# Patient Record
Sex: FEMALE | Race: ASIAN | NOT HISPANIC OR LATINO | Employment: OTHER | URBAN - METROPOLITAN AREA
[De-identification: names, ages, dates, MRNs, and addresses within clinical notes are randomized per-mention and may not be internally consistent; named-entity substitution may affect disease eponyms.]

---

## 2019-01-16 ENCOUNTER — LAB VISIT (OUTPATIENT)
Dept: LAB | Facility: HOSPITAL | Age: 24
End: 2019-01-16
Attending: OBSTETRICS & GYNECOLOGY
Payer: COMMERCIAL

## 2019-01-16 ENCOUNTER — INITIAL PRENATAL (OUTPATIENT)
Dept: OBSTETRICS AND GYNECOLOGY | Facility: CLINIC | Age: 24
End: 2019-01-16
Payer: COMMERCIAL

## 2019-01-16 VITALS — SYSTOLIC BLOOD PRESSURE: 112 MMHG | WEIGHT: 146.63 LBS | DIASTOLIC BLOOD PRESSURE: 58 MMHG

## 2019-01-16 DIAGNOSIS — Z34.90 EARLY STAGE OF PREGNANCY: ICD-10-CM

## 2019-01-16 DIAGNOSIS — N84.1 CERVICAL POLYP: ICD-10-CM

## 2019-01-16 DIAGNOSIS — Z20.4 RUBELLA VACCINE EXPOSURE AFFECTING PREGNANCY: Primary | ICD-10-CM

## 2019-01-16 DIAGNOSIS — Z3A.13 13 WEEKS GESTATION OF PREGNANCY: ICD-10-CM

## 2019-01-16 DIAGNOSIS — O99.891 RUBELLA VACCINE EXPOSURE AFFECTING PREGNANCY: Primary | ICD-10-CM

## 2019-01-16 LAB
ABO + RH BLD: NORMAL
BLD GP AB SCN CELLS X3 SERPL QL: NORMAL
ERYTHROCYTE [DISTWIDTH] IN BLOOD BY AUTOMATED COUNT: 12.9 %
HCT VFR BLD AUTO: 37.1 %
HGB BLD-MCNC: 12.8 G/DL
MCH RBC QN AUTO: 30.2 PG
MCHC RBC AUTO-ENTMCNC: 34.5 G/DL
MCV RBC AUTO: 88 FL
PLATELET # BLD AUTO: 246 K/UL
PMV BLD AUTO: 9.4 FL
RBC # BLD AUTO: 4.24 M/UL
WBC # BLD AUTO: 10.16 K/UL

## 2019-01-16 PROCEDURE — 83020 HEMOGLOBIN ELECTROPHORESIS: CPT

## 2019-01-16 PROCEDURE — 86901 BLOOD TYPING SEROLOGIC RH(D): CPT

## 2019-01-16 PROCEDURE — 76801 PR US, OB <14WKS, TRANSABD, SINGLE GESTATION: ICD-10-PCS | Mod: S$GLB,,, | Performed by: OBSTETRICS & GYNECOLOGY

## 2019-01-16 PROCEDURE — 81003 URINALYSIS AUTO W/O SCOPE: CPT

## 2019-01-16 PROCEDURE — 85027 COMPLETE CBC AUTOMATED: CPT

## 2019-01-16 PROCEDURE — 86703 HIV-1/HIV-2 1 RESULT ANTBDY: CPT

## 2019-01-16 PROCEDURE — 99999 PR PBB SHADOW E&M-NEW PATIENT-LVL II: ICD-10-PCS | Mod: PBBFAC,,, | Performed by: OBSTETRICS & GYNECOLOGY

## 2019-01-16 PROCEDURE — 88175 CYTOPATH C/V AUTO FLUID REDO: CPT

## 2019-01-16 PROCEDURE — 87480 CANDIDA DNA DIR PROBE: CPT

## 2019-01-16 PROCEDURE — 0500F PR INITIAL PRENATAL CARE VISIT: ICD-10-PCS | Mod: S$GLB,,, | Performed by: OBSTETRICS & GYNECOLOGY

## 2019-01-16 PROCEDURE — 36415 COLL VENOUS BLD VENIPUNCTURE: CPT

## 2019-01-16 PROCEDURE — 80074 ACUTE HEPATITIS PANEL: CPT

## 2019-01-16 PROCEDURE — 87491 CHLMYD TRACH DNA AMP PROBE: CPT

## 2019-01-16 PROCEDURE — 81220 CFTR GENE COM VARIANTS: CPT

## 2019-01-16 PROCEDURE — 86762 RUBELLA ANTIBODY: CPT

## 2019-01-16 PROCEDURE — 86592 SYPHILIS TEST NON-TREP QUAL: CPT

## 2019-01-16 PROCEDURE — 99999 PR PBB SHADOW E&M-NEW PATIENT-LVL II: CPT | Mod: PBBFAC,,, | Performed by: OBSTETRICS & GYNECOLOGY

## 2019-01-16 PROCEDURE — 76801 OB US < 14 WKS SINGLE FETUS: CPT | Mod: S$GLB,,, | Performed by: OBSTETRICS & GYNECOLOGY

## 2019-01-16 PROCEDURE — 0500F INITIAL PRENATAL CARE VISIT: CPT | Mod: S$GLB,,, | Performed by: OBSTETRICS & GYNECOLOGY

## 2019-01-17 PROBLEM — N84.1 CERVICAL POLYP: Status: ACTIVE | Noted: 2019-01-17

## 2019-01-17 PROBLEM — O99.891: Status: ACTIVE | Noted: 2019-01-17

## 2019-01-17 PROBLEM — Z20.4: Status: ACTIVE | Noted: 2019-01-17

## 2019-01-17 LAB
BILIRUB UR QL STRIP: NEGATIVE
CLARITY UR: CLEAR
COLOR UR: ABNORMAL
GLUCOSE UR QL STRIP: NEGATIVE
HAV IGM SERPL QL IA: NEGATIVE
HBV CORE IGM SERPL QL IA: NEGATIVE
HBV SURFACE AG SERPL QL IA: NEGATIVE
HBV SURFACE AG SERPL QL IA: NEGATIVE
HCV AB SERPL QL IA: NEGATIVE
HGB UR QL STRIP: NEGATIVE
HIV 1+2 AB+HIV1 P24 AG SERPL QL IA: NEGATIVE
KETONES UR QL STRIP: ABNORMAL
LEUKOCYTE ESTERASE UR QL STRIP: NEGATIVE
NITRITE UR QL STRIP: NEGATIVE
PH UR STRIP: 6 [PH] (ref 5–8)
PROT UR QL STRIP: NEGATIVE
SP GR UR STRIP: 1.01 (ref 1–1.03)
URN SPEC COLLECT METH UR: ABNORMAL
UROBILINOGEN UR STRIP-ACNC: NEGATIVE EU/DL

## 2019-01-17 NOTE — PROGRESS NOTES
Patient presented today for new Ob appt.    Planned pregnancy: no she is  and excited  Unsure of LMP, but reported had MMR vaccine in November    ROS: NEG except    VB?: no  Cramping?:  no  Nausea/vomitting?: no    PMH: none  PSH: none  OBH: G1  GynHx: cycles every 1 - 1.5 month. Denies std    BP (!) 112/58   Wt 66.5 kg (146 lb 9.7 oz)   LMP  (LMP Unknown)   Phys Exam:  Abdomen: soft,NT, ND  Vulva and vagina appear normal. Bimanual exam reveals normal uterus and adnexa.  External genitalia: normal general appearance  Cervix: 3 x 2 cm polyp noted at cervical os    Transvaginal US:  Single IUP  YS not seen  GS In utero  CRL 6.92 cm, 13w1d   bpm  Adnexa normal  Today's US with STAR 7/23/19  LMP unknown ,  Final STAR:  7/23/19    1.  Early IUP:       -prenatal labs today       -GC/CT, affirm and papsmear done today       -Bleeding precautions       -Aneuploidy screen offered. Patient desires 2nd trimester aneuploidy screening       -Diet, exercise, and social habits d/w patients. Patient was counseled today on  proper weight gain based on the [facility] [employer] [facility]'s recommendations based  on her pre-pregnancy BMI normal, total pregnancy weight gain of 25-35 lbs.       -Discussed foods to avoid in pregnancy (i.e. sushi, fish that are high in mercury,  deli meat, and unpasteurized cheeses). Discussed  prenatal vitamin options and  safe medications in pregnancy.     2. Discussed how call group function, we do most of our deliveries however other providers will be covering and possibility will be there at the time of deliveries.  Pt voiced understanding.  3. Exposure to MMR early pregnancy:  Discussed with pt the theoretical risk of congential rubella.   4. Cervical polyp:  Discussed w/ pt. Consider removal at term gestation. Bleeding precautions  RTC in 4 weeks for Massimo visit

## 2019-01-18 LAB
CANDIDA RRNA VAG QL PROBE: NEGATIVE
CFTR MUT ANL BLD/T: NORMAL
G VAGINALIS RRNA GENITAL QL PROBE: POSITIVE
RPR SER QL: NORMAL
RUBV IGG SER-ACNC: 121 IU/ML
RUBV IGG SER-IMP: REACTIVE
T VAGINALIS RRNA GENITAL QL PROBE: NEGATIVE

## 2019-01-19 LAB
C TRACH DNA SPEC QL NAA+PROBE: NOT DETECTED
HGB A MFR BLD ELPH: 96.9 % (ref 95.8–98)
HGB A2 MFR BLD: 3.1 % (ref 2–3.3)
HGB F MFR BLD: 0 % (ref 0–0.9)
HGB FRACT BLD ELPH-IMP: NORMAL
HGB OTHER MFR BLD ELPH: 0 %
N GONORRHOEA DNA SPEC QL NAA+PROBE: NOT DETECTED
THEVP VARIANT 2: NORMAL
THEVP VARIANT 3: NORMAL

## 2019-01-22 RX ORDER — METRONIDAZOLE 500 MG/1
500 TABLET ORAL EVERY 12 HOURS
Qty: 14 TABLET | Refills: 0 | Status: SHIPPED | OUTPATIENT
Start: 2019-01-22 | End: 2019-01-29

## 2019-01-30 ENCOUNTER — TELEPHONE (OUTPATIENT)
Dept: OBSTETRICS AND GYNECOLOGY | Facility: CLINIC | Age: 24
End: 2019-01-30

## 2019-01-30 NOTE — TELEPHONE ENCOUNTER
----- Message from Jessica Suh sent at 1/30/2019  2:53 PM CST -----  Contact: Self/ [phone]  Patient would like Dr. Hammond to give her a call.  She's trying to get her citizenship and need a letter from  that she is pregnant and how long she is in her pregnancy.  Thank you.    Patient is aware that her pregnancy confirmation letter is ready for

## 2019-02-13 ENCOUNTER — LAB VISIT (OUTPATIENT)
Dept: LAB | Facility: HOSPITAL | Age: 24
End: 2019-02-13
Attending: OBSTETRICS & GYNECOLOGY
Payer: COMMERCIAL

## 2019-02-13 ENCOUNTER — ROUTINE PRENATAL (OUTPATIENT)
Dept: OBSTETRICS AND GYNECOLOGY | Facility: CLINIC | Age: 24
End: 2019-02-13
Payer: COMMERCIAL

## 2019-02-13 VITALS — WEIGHT: 154.13 LBS | DIASTOLIC BLOOD PRESSURE: 53 MMHG | SYSTOLIC BLOOD PRESSURE: 116 MMHG

## 2019-02-13 DIAGNOSIS — Z36.9 ENCOUNTER FOR ANTENATAL SCREENING: ICD-10-CM

## 2019-02-13 DIAGNOSIS — O99.891 RUBELLA VACCINE EXPOSURE AFFECTING PREGNANCY: Primary | ICD-10-CM

## 2019-02-13 DIAGNOSIS — Z20.4 RUBELLA VACCINE EXPOSURE AFFECTING PREGNANCY: Primary | ICD-10-CM

## 2019-02-13 DIAGNOSIS — Z3A.17 17 WEEKS GESTATION OF PREGNANCY: ICD-10-CM

## 2019-02-13 PROCEDURE — 0502F SUBSEQUENT PRENATAL CARE: CPT | Mod: CPTII,S$GLB,, | Performed by: OBSTETRICS & GYNECOLOGY

## 2019-02-13 PROCEDURE — 81511 FTL CGEN ABNOR FOUR ANAL: CPT

## 2019-02-13 PROCEDURE — 0502F PR SUBSEQUENT PRENATAL CARE: ICD-10-PCS | Mod: CPTII,S$GLB,, | Performed by: OBSTETRICS & GYNECOLOGY

## 2019-02-13 PROCEDURE — 36415 COLL VENOUS BLD VENIPUNCTURE: CPT

## 2019-02-13 PROCEDURE — 99999 PR PBB SHADOW E&M-EST. PATIENT-LVL II: CPT | Mod: PBBFAC,,, | Performed by: OBSTETRICS & GYNECOLOGY

## 2019-02-13 PROCEDURE — 99999 PR PBB SHADOW E&M-EST. PATIENT-LVL II: ICD-10-PCS | Mod: PBBFAC,,, | Performed by: OBSTETRICS & GYNECOLOGY

## 2019-02-13 RX ORDER — MULTIVIT 47/IRON/FOLATE 1/DHA 27-1-300MG
CAPSULE ORAL
COMMUNITY
Start: 2019-01-16

## 2019-02-13 NOTE — PROGRESS NOTES
Here for routine OB visit  Denies VB/ctx/LOF. Active FM  Denies PreE symptoms/UTI symptoms  ROS: Negative except for round ligament pain    General:  NAD, AxO x 3  Abdomen: soft, gravid, non-tender  Ext: peripheral pulses normal, no pedal edema, no clubbing or cyanosis  Skin: warm, moist    Discussed supportive care for round ligament pain  QS and anatomy US ordered  Limited US; boy    Strict bleeding precautions    Next visit: 4 weeks

## 2019-02-15 LAB
# FETUSES US: NORMAL
2ND TRIMESTER 4 SCREEN PNL SERPL: NEGATIVE
2ND TRIMESTER 4 SCREEN SERPL-IMP: NORMAL
AFP MOM SERPL: 0.7
AFP SERPL-MCNC: 25.7 NG/ML
AGE AT DELIVERY: 23
B-HCG MOM SERPL: 0.81
B-HCG SERPL-ACNC: 20.7 IU/ML
FET TS 21 RISK FROM MAT AGE: NORMAL
GA (DAYS): 1 D
GA (WEEKS): 17 WK
GA METHOD: NORMAL
IDDM PATIENT QL: NORMAL
INHIBIN A MOM SERPL: 1.09
INHIBIN A SERPL-MCNC: 177.1 PG/ML
SMOKING STATUS FTND: NO
TS 18 RISK FETUS: NORMAL
TS 21 RISK FETUS: NORMAL
U ESTRIOL MOM SERPL: 1.27
U ESTRIOL SERPL-MCNC: 1.49 NG/ML

## 2019-02-15 NOTE — PROGRESS NOTES
Please inform pt quad screen was negative. This means that her baby is at low risk for Down's syndrome or spina bifida.

## 2019-03-13 ENCOUNTER — ROUTINE PRENATAL (OUTPATIENT)
Dept: OBSTETRICS AND GYNECOLOGY | Facility: CLINIC | Age: 24
End: 2019-03-13
Payer: COMMERCIAL

## 2019-03-13 VITALS — WEIGHT: 156.94 LBS | DIASTOLIC BLOOD PRESSURE: 57 MMHG | SYSTOLIC BLOOD PRESSURE: 103 MMHG

## 2019-03-13 DIAGNOSIS — O99.891 RUBELLA VACCINE EXPOSURE AFFECTING PREGNANCY: Primary | ICD-10-CM

## 2019-03-13 DIAGNOSIS — Z3A.21 21 WEEKS GESTATION OF PREGNANCY: ICD-10-CM

## 2019-03-13 DIAGNOSIS — Z20.4 RUBELLA VACCINE EXPOSURE AFFECTING PREGNANCY: Primary | ICD-10-CM

## 2019-03-13 PROCEDURE — 99999 PR PBB SHADOW E&M-EST. PATIENT-LVL II: ICD-10-PCS | Mod: PBBFAC,,, | Performed by: OBSTETRICS & GYNECOLOGY

## 2019-03-13 PROCEDURE — 0502F PR SUBSEQUENT PRENATAL CARE: ICD-10-PCS | Mod: CPTII,S$GLB,, | Performed by: OBSTETRICS & GYNECOLOGY

## 2019-03-13 PROCEDURE — 99999 PR PBB SHADOW E&M-EST. PATIENT-LVL II: CPT | Mod: PBBFAC,,, | Performed by: OBSTETRICS & GYNECOLOGY

## 2019-03-13 PROCEDURE — 0502F SUBSEQUENT PRENATAL CARE: CPT | Mod: CPTII,S$GLB,, | Performed by: OBSTETRICS & GYNECOLOGY

## 2019-03-13 NOTE — PROGRESS NOTES
Here for routine OB visit  Denies VB/ctx/LOF. Active FM  Denies PreE symptoms/UTI symptoms  ROS: Negative    General:  NAD, AxO x 3  Abdomen: soft, gravid, non-tender  Ext: peripheral pulses normal, no pedal edema, no clubbing or cyanosis  Skin: normal, good color, good turgor and no lesions    QS negative  Anatomy scheduled for 3/19/19    Strict bleeding precautions    Next visit: 4 weeks

## 2019-03-19 ENCOUNTER — HOSPITAL ENCOUNTER (OUTPATIENT)
Dept: PERINATAL CARE | Facility: HOSPITAL | Age: 24
Discharge: HOME | End: 2019-03-19
Attending: OBSTETRICS & GYNECOLOGY
Payer: COMMERCIAL

## 2019-03-19 DIAGNOSIS — Z3A.17 17 WEEKS GESTATION OF PREGNANCY: ICD-10-CM

## 2019-03-19 DIAGNOSIS — Z20.4 RUBELLA VACCINE EXPOSURE AFFECTING PREGNANCY: ICD-10-CM

## 2019-03-19 DIAGNOSIS — O34.42 HISTORY OF LEEP (LOOP ELECTROSURGICAL EXCISION PROCEDURE) OF CERVIX COMPLICATING PREGNANCY IN SECOND TRIMESTER: ICD-10-CM

## 2019-03-19 DIAGNOSIS — Z36.89 ENCOUNTER FOR FETAL ANATOMIC SURVEY: ICD-10-CM

## 2019-03-19 DIAGNOSIS — Z36.9 ENCOUNTER FOR ANTENATAL SCREENING: ICD-10-CM

## 2019-03-19 DIAGNOSIS — Z98.890 HISTORY OF LEEP (LOOP ELECTROSURGICAL EXCISION PROCEDURE) OF CERVIX COMPLICATING PREGNANCY IN SECOND TRIMESTER: ICD-10-CM

## 2019-03-19 DIAGNOSIS — O99.891 RUBELLA VACCINE EXPOSURE AFFECTING PREGNANCY: ICD-10-CM

## 2019-03-19 PROCEDURE — 76811 OB US DETAILED SNGL FETUS: CPT | Mod: 26,,, | Performed by: OBSTETRICS & GYNECOLOGY

## 2019-03-19 PROCEDURE — 76817 PR US, OB, TRANSVAG APPROACH: ICD-10-PCS | Mod: 26,,, | Performed by: OBSTETRICS & GYNECOLOGY

## 2019-03-19 PROCEDURE — 76811 OB US DETAILED SNGL FETUS: CPT

## 2019-03-19 PROCEDURE — 76817 TRANSVAGINAL US OBSTETRIC: CPT

## 2019-03-19 PROCEDURE — 76817 TRANSVAGINAL US OBSTETRIC: CPT | Mod: 26,,, | Performed by: OBSTETRICS & GYNECOLOGY

## 2019-03-19 PROCEDURE — 76811 PR US, OB FETAL EVAL & EXAM, TRANSABDOM,FIRST GESTATION: ICD-10-PCS | Mod: 26,,, | Performed by: OBSTETRICS & GYNECOLOGY

## 2019-03-20 ENCOUNTER — TELEPHONE (OUTPATIENT)
Dept: OBSTETRICS AND GYNECOLOGY | Facility: CLINIC | Age: 24
End: 2019-03-20

## 2019-03-20 NOTE — TELEPHONE ENCOUNTER
----- Message from [facility] Lawson Mai, MD sent at 3/20/2019 12:31 PM CDT -----  Please inform normal anatomy US

## 2019-04-10 ENCOUNTER — ROUTINE PRENATAL (OUTPATIENT)
Dept: OBSTETRICS AND GYNECOLOGY | Facility: CLINIC | Age: 24
End: 2019-04-10
Payer: COMMERCIAL

## 2019-04-10 VITALS — SYSTOLIC BLOOD PRESSURE: 120 MMHG | DIASTOLIC BLOOD PRESSURE: 59 MMHG | WEIGHT: 160 LBS

## 2019-04-10 DIAGNOSIS — O99.891 RUBELLA VACCINE EXPOSURE AFFECTING PREGNANCY: Primary | ICD-10-CM

## 2019-04-10 DIAGNOSIS — Z20.4 RUBELLA VACCINE EXPOSURE AFFECTING PREGNANCY: Primary | ICD-10-CM

## 2019-04-10 DIAGNOSIS — Z3A.25 25 WEEKS GESTATION OF PREGNANCY: ICD-10-CM

## 2019-04-10 PROCEDURE — 0502F SUBSEQUENT PRENATAL CARE: CPT | Mod: CPTII,S$GLB,, | Performed by: OBSTETRICS & GYNECOLOGY

## 2019-04-10 PROCEDURE — 99999 PR PBB SHADOW E&M-EST. PATIENT-LVL II: CPT | Mod: PBBFAC,,, | Performed by: OBSTETRICS & GYNECOLOGY

## 2019-04-10 PROCEDURE — 0502F PR SUBSEQUENT PRENATAL CARE: ICD-10-PCS | Mod: CPTII,S$GLB,, | Performed by: OBSTETRICS & GYNECOLOGY

## 2019-04-10 PROCEDURE — 99999 PR PBB SHADOW E&M-EST. PATIENT-LVL II: ICD-10-PCS | Mod: PBBFAC,,, | Performed by: OBSTETRICS & GYNECOLOGY

## 2019-04-10 NOTE — PROGRESS NOTES
Here for routine OB visit  Denies VB/ctx/LOF. Active FM. Pt c/o rash all over her arms and legs, has been scratching, has not tried anything.  Had been sneezing.  Denies PreE symptoms/UTI symptoms  ROS: Negative except HPI    General:  NAD, AxO x 3  Abdomen: soft, gravid, non-tender  Ext: peripheral pulses normal, no pedal edema, no clubbing or cyanosis  Skin: multiple small rashes on b/l arms and legs    Anatomy US normal   3rd trim lab  Likely seasonal allergy: recommend prednisone cream, zyrtec/benadryl prn    Strict Labor precautions, FKCs    Next visit: tdap

## 2019-04-15 ENCOUNTER — LAB VISIT (OUTPATIENT)
Dept: LAB | Facility: HOSPITAL | Age: 24
End: 2019-04-15
Attending: OBSTETRICS & GYNECOLOGY
Payer: MEDICAID

## 2019-04-15 DIAGNOSIS — O99.810 ABNORMAL GLUCOSE TOLERANCE IN PREGNANCY: Primary | ICD-10-CM

## 2019-04-15 DIAGNOSIS — Z3A.25 25 WEEKS GESTATION OF PREGNANCY: ICD-10-CM

## 2019-04-15 LAB
BASOPHILS # BLD AUTO: 0.03 K/UL (ref 0–0.2)
BASOPHILS NFR BLD: 0.3 % (ref 0–1.9)
DIFFERENTIAL METHOD: ABNORMAL
EOSINOPHIL # BLD AUTO: 0.3 K/UL (ref 0–0.5)
EOSINOPHIL NFR BLD: 2.5 % (ref 0–8)
ERYTHROCYTE [DISTWIDTH] IN BLOOD BY AUTOMATED COUNT: 13 % (ref 11.5–14.5)
GLUCOSE SERPL-MCNC: 153 MG/DL (ref 70–140)
HCT VFR BLD AUTO: 35.7 % (ref 37–48.5)
HGB BLD-MCNC: 11.9 G/DL (ref 12–16)
LYMPHOCYTES # BLD AUTO: 1.8 K/UL (ref 1–4.8)
LYMPHOCYTES NFR BLD: 17.5 % (ref 18–48)
MCH RBC QN AUTO: 30.4 PG (ref 27–31)
MCHC RBC AUTO-ENTMCNC: 33.3 G/DL (ref 32–36)
MCV RBC AUTO: 91 FL (ref 82–98)
MONOCYTES # BLD AUTO: 0.6 K/UL (ref 0.3–1)
MONOCYTES NFR BLD: 5.8 % (ref 4–15)
NEUTROPHILS # BLD AUTO: 7.5 K/UL (ref 1.8–7.7)
NEUTROPHILS NFR BLD: 73.9 % (ref 38–73)
PLATELET # BLD AUTO: 254 K/UL (ref 150–350)
PMV BLD AUTO: 9.4 FL (ref 9.2–12.9)
RBC # BLD AUTO: 3.91 M/UL (ref 4–5.4)
WBC # BLD AUTO: 10.12 K/UL (ref 3.9–12.7)

## 2019-04-15 PROCEDURE — 86703 HIV-1/HIV-2 1 RESULT ANTBDY: CPT

## 2019-04-15 PROCEDURE — 85025 COMPLETE CBC W/AUTO DIFF WBC: CPT

## 2019-04-15 PROCEDURE — 82950 GLUCOSE TEST: CPT

## 2019-04-15 PROCEDURE — 36415 COLL VENOUS BLD VENIPUNCTURE: CPT

## 2019-04-15 PROCEDURE — 86592 SYPHILIS TEST NON-TREP QUAL: CPT

## 2019-04-15 NOTE — PROGRESS NOTES
Please inform pt her 1hr gtt is high  She will need 3 hr gtt, this will take 3-4 hours  She will need 8-10 hours of fasting prior to this lab

## 2019-04-16 LAB
HIV 1+2 AB+HIV1 P24 AG SERPL QL IA: NEGATIVE
RPR SER QL: NORMAL

## 2019-05-01 ENCOUNTER — LAB VISIT (OUTPATIENT)
Dept: LAB | Facility: HOSPITAL | Age: 24
End: 2019-05-01
Attending: OBSTETRICS & GYNECOLOGY
Payer: MEDICAID

## 2019-05-01 ENCOUNTER — TELEPHONE (OUTPATIENT)
Dept: OBSTETRICS AND GYNECOLOGY | Facility: CLINIC | Age: 24
End: 2019-05-01

## 2019-05-01 DIAGNOSIS — O99.810 ABNORMAL GLUCOSE TOLERANCE IN PREGNANCY: ICD-10-CM

## 2019-05-01 LAB
GLUCOSE SERPL-MCNC: 102 MG/DL
GLUCOSE SERPL-MCNC: 143 MG/DL
GLUCOSE SERPL-MCNC: 181 MG/DL
GLUCOSE SERPL-MCNC: 83 MG/DL (ref 70–110)

## 2019-05-01 PROCEDURE — 82951 GLUCOSE TOLERANCE TEST (GTT): CPT

## 2019-05-01 PROCEDURE — 36415 COLL VENOUS BLD VENIPUNCTURE: CPT

## 2019-05-01 PROCEDURE — 82952 GTT-ADDED SAMPLES: CPT

## 2019-05-01 NOTE — TELEPHONE ENCOUNTER
----- Message from [facility] Lawson Mai, MD sent at 5/1/2019  1:33 PM CDT -----  Please advise pt 3hr GTT is  Normal  I do advise pt to cut down on carbohydrates in her diet

## 2019-05-01 NOTE — TELEPHONE ENCOUNTER
Patient notified of normal glucose tolerance test. She was also instructed to cut back on carbohydrates.

## 2019-05-08 ENCOUNTER — ROUTINE PRENATAL (OUTPATIENT)
Dept: OBSTETRICS AND GYNECOLOGY | Facility: CLINIC | Age: 24
End: 2019-05-08
Payer: MEDICAID

## 2019-05-08 ENCOUNTER — CLINICAL SUPPORT (OUTPATIENT)
Dept: OBSTETRICS AND GYNECOLOGY | Facility: CLINIC | Age: 24
End: 2019-05-08
Payer: MEDICAID

## 2019-05-08 VITALS — WEIGHT: 169.06 LBS | SYSTOLIC BLOOD PRESSURE: 112 MMHG | DIASTOLIC BLOOD PRESSURE: 64 MMHG

## 2019-05-08 VITALS — DIASTOLIC BLOOD PRESSURE: 64 MMHG | SYSTOLIC BLOOD PRESSURE: 112 MMHG | WEIGHT: 169.06 LBS

## 2019-05-08 DIAGNOSIS — Z3A.29 29 WEEKS GESTATION OF PREGNANCY: ICD-10-CM

## 2019-05-08 DIAGNOSIS — Z20.4 RUBELLA VACCINE EXPOSURE AFFECTING PREGNANCY: Primary | ICD-10-CM

## 2019-05-08 DIAGNOSIS — Z23 NEED FOR TDAP VACCINATION: Primary | ICD-10-CM

## 2019-05-08 DIAGNOSIS — O99.891 RUBELLA VACCINE EXPOSURE AFFECTING PREGNANCY: Primary | ICD-10-CM

## 2019-05-08 PROCEDURE — 99999 PR PBB SHADOW E&M-EST. PATIENT-LVL II: CPT | Mod: PBBFAC,,,

## 2019-05-08 PROCEDURE — 99212 OFFICE O/P EST SF 10 MIN: CPT | Mod: PBBFAC,27,25

## 2019-05-08 PROCEDURE — 99213 OFFICE O/P EST LOW 20 MIN: CPT | Mod: TH,S$PBB,, | Performed by: OBSTETRICS & GYNECOLOGY

## 2019-05-08 PROCEDURE — 90471 IMMUNIZATION ADMIN: CPT | Mod: PBBFAC,VFC

## 2019-05-08 PROCEDURE — 99999 PR PBB SHADOW E&M-EST. PATIENT-LVL II: ICD-10-PCS | Mod: PBBFAC,,, | Performed by: OBSTETRICS & GYNECOLOGY

## 2019-05-08 PROCEDURE — 99213 PR OFFICE/OUTPT VISIT, EST, LEVL III, 20-29 MIN: ICD-10-PCS | Mod: TH,S$PBB,, | Performed by: OBSTETRICS & GYNECOLOGY

## 2019-05-08 PROCEDURE — 99999 PR PBB SHADOW E&M-EST. PATIENT-LVL II: CPT | Mod: PBBFAC,,, | Performed by: OBSTETRICS & GYNECOLOGY

## 2019-05-08 PROCEDURE — 99212 OFFICE O/P EST SF 10 MIN: CPT | Mod: PBBFAC,TH | Performed by: OBSTETRICS & GYNECOLOGY

## 2019-05-08 PROCEDURE — 99999 PR PBB SHADOW E&M-EST. PATIENT-LVL II: ICD-10-PCS | Mod: PBBFAC,,,

## 2019-05-08 RX ORDER — PRENATAL WITH FERROUS FUM AND FOLIC ACID 3080; 920; 120; 400; 22; 1.84; 3; 20; 10; 1; 12; 200; 27; 25; 2 [IU]/1; [IU]/1; MG/1; [IU]/1; MG/1; MG/1; MG/1; MG/1; MG/1; MG/1; UG/1; MG/1; MG/1; MG/1; MG/1
TABLET ORAL
COMMUNITY
Start: 2019-03-13

## 2019-05-08 NOTE — PROGRESS NOTES
Here for routine OB visit  Denies VB/ctx/LOF. Active FM, irregular won's hick  Denies PreE symptoms/UTI symptoms  ROS: Negative except for HPI    General:  NAD, AxO x 3  Abdomen: soft, gravid, non-tender  Ext: peripheral pulses normal, no pedal edema, no clubbing or cyanosis  Skin: normal, good color, good turgor and no lesions    tdap today  3rd trim lab with normal 3hr gtt   Advised pt to cut down on carb    Strict Labor precautions, FKCs    Next visit: 2 weeks

## 2019-05-08 NOTE — PROGRESS NOTES
PT ARRIVED @  0915  , PT'S ORIGINAL APPT WAS @ 1000  , PT WAS ROOMED @  1001    Pt here for T- DAP injection, explained what the medication is for, TDAP handout given to pt,  reviewed documentation of medication with pt, injection given via L Deltoid w/o difficulty. Pt stated her understanding of all instructions. Instructed pt to wait in the waiting room for 10-15 mins in case of an immediate adverse reaction

## 2019-05-21 ENCOUNTER — ROUTINE PRENATAL (OUTPATIENT)
Dept: OBSTETRICS AND GYNECOLOGY | Facility: CLINIC | Age: 24
End: 2019-05-21
Payer: MEDICAID

## 2019-05-21 VITALS — DIASTOLIC BLOOD PRESSURE: 60 MMHG | WEIGHT: 171.75 LBS | SYSTOLIC BLOOD PRESSURE: 94 MMHG

## 2019-05-21 DIAGNOSIS — Z20.4 RUBELLA VACCINE EXPOSURE AFFECTING PREGNANCY: Primary | ICD-10-CM

## 2019-05-21 DIAGNOSIS — Z3A.31 31 WEEKS GESTATION OF PREGNANCY: ICD-10-CM

## 2019-05-21 DIAGNOSIS — O99.891 RUBELLA VACCINE EXPOSURE AFFECTING PREGNANCY: Primary | ICD-10-CM

## 2019-05-21 DIAGNOSIS — N84.1 CERVICAL POLYP: ICD-10-CM

## 2019-05-21 PROCEDURE — 99999 PR PBB SHADOW E&M-EST. PATIENT-LVL II: ICD-10-PCS | Mod: PBBFAC,,, | Performed by: OBSTETRICS & GYNECOLOGY

## 2019-05-21 PROCEDURE — 99213 OFFICE O/P EST LOW 20 MIN: CPT | Mod: TH,S$PBB,, | Performed by: OBSTETRICS & GYNECOLOGY

## 2019-05-21 PROCEDURE — 99999 PR PBB SHADOW E&M-EST. PATIENT-LVL II: CPT | Mod: PBBFAC,,, | Performed by: OBSTETRICS & GYNECOLOGY

## 2019-05-21 PROCEDURE — 99213 PR OFFICE/OUTPT VISIT, EST, LEVL III, 20-29 MIN: ICD-10-PCS | Mod: TH,S$PBB,, | Performed by: OBSTETRICS & GYNECOLOGY

## 2019-05-21 PROCEDURE — 99212 OFFICE O/P EST SF 10 MIN: CPT | Mod: PBBFAC,TH | Performed by: OBSTETRICS & GYNECOLOGY

## 2019-05-21 NOTE — PROGRESS NOTES
Here for routine OB visit  Denies VB/ctx/LOF. Active FM  Denies PreE symptoms/UTI symptoms  ROS: Negative    General:  NAD, AxO x 3  Abdomen: soft, gravid, non-tender  Ext: peripheral pulses normal, no pedal edema, no clubbing or cyanosis  Skin: normal, good color, good turgor and no lesions      Strict Labor precautions, FKCs    Next visit: 2 weeks

## 2019-06-05 ENCOUNTER — ROUTINE PRENATAL (OUTPATIENT)
Dept: OBSTETRICS AND GYNECOLOGY | Facility: CLINIC | Age: 24
End: 2019-06-05
Payer: MEDICAID

## 2019-06-05 VITALS — DIASTOLIC BLOOD PRESSURE: 68 MMHG | WEIGHT: 175.94 LBS | SYSTOLIC BLOOD PRESSURE: 112 MMHG

## 2019-06-05 DIAGNOSIS — O99.891 RUBELLA VACCINE EXPOSURE AFFECTING PREGNANCY: Primary | ICD-10-CM

## 2019-06-05 DIAGNOSIS — Z3A.33 33 WEEKS GESTATION OF PREGNANCY: ICD-10-CM

## 2019-06-05 DIAGNOSIS — Z20.4 RUBELLA VACCINE EXPOSURE AFFECTING PREGNANCY: Primary | ICD-10-CM

## 2019-06-05 PROCEDURE — 99212 OFFICE O/P EST SF 10 MIN: CPT | Mod: PBBFAC,TH | Performed by: OBSTETRICS & GYNECOLOGY

## 2019-06-05 PROCEDURE — 99212 OFFICE O/P EST SF 10 MIN: CPT | Mod: TH,S$PBB,, | Performed by: OBSTETRICS & GYNECOLOGY

## 2019-06-05 PROCEDURE — 99212 PR OFFICE/OUTPT VISIT, EST, LEVL II, 10-19 MIN: ICD-10-PCS | Mod: TH,S$PBB,, | Performed by: OBSTETRICS & GYNECOLOGY

## 2019-06-05 PROCEDURE — 99999 PR PBB SHADOW E&M-EST. PATIENT-LVL II: ICD-10-PCS | Mod: PBBFAC,,, | Performed by: OBSTETRICS & GYNECOLOGY

## 2019-06-05 PROCEDURE — 99999 PR PBB SHADOW E&M-EST. PATIENT-LVL II: CPT | Mod: PBBFAC,,, | Performed by: OBSTETRICS & GYNECOLOGY

## 2019-06-05 NOTE — PROGRESS NOTES
Here for routine OB visit  Denies VB/ctx/LOF. Active FM  Denies PreE symptoms/UTI symptoms  ROS: Negative except for stretch marks    General:  NAD, AxO x 3  Abdomen: soft, gravid, non-tender  Ext: peripheral pulses normal, trace pedal edema, no clubbing or cyanosis  Skin: normal, good color, good turgor and no lesions. Stretch marks on b/l lower abdomen    rec mederma, coconut oil for stretch marks      Strict Labor precautions, FKCs    Next visit: gbs/consent

## 2019-06-20 ENCOUNTER — ROUTINE PRENATAL (OUTPATIENT)
Dept: OBSTETRICS AND GYNECOLOGY | Facility: CLINIC | Age: 24
End: 2019-06-20
Payer: MEDICAID

## 2019-06-20 VITALS — WEIGHT: 179.88 LBS | SYSTOLIC BLOOD PRESSURE: 110 MMHG | DIASTOLIC BLOOD PRESSURE: 78 MMHG

## 2019-06-20 DIAGNOSIS — Z20.4 RUBELLA VACCINE EXPOSURE AFFECTING PREGNANCY: Primary | ICD-10-CM

## 2019-06-20 DIAGNOSIS — Z3A.35 35 WEEKS GESTATION OF PREGNANCY: ICD-10-CM

## 2019-06-20 DIAGNOSIS — O99.891 RUBELLA VACCINE EXPOSURE AFFECTING PREGNANCY: Primary | ICD-10-CM

## 2019-06-20 PROCEDURE — 99213 OFFICE O/P EST LOW 20 MIN: CPT | Mod: TH,S$PBB,, | Performed by: OBSTETRICS & GYNECOLOGY

## 2019-06-20 PROCEDURE — 99999 PR PBB SHADOW E&M-EST. PATIENT-LVL II: ICD-10-PCS | Mod: PBBFAC,,, | Performed by: OBSTETRICS & GYNECOLOGY

## 2019-06-20 PROCEDURE — 99212 OFFICE O/P EST SF 10 MIN: CPT | Mod: PBBFAC,TH | Performed by: OBSTETRICS & GYNECOLOGY

## 2019-06-20 PROCEDURE — 87081 CULTURE SCREEN ONLY: CPT

## 2019-06-20 PROCEDURE — 99213 PR OFFICE/OUTPT VISIT, EST, LEVL III, 20-29 MIN: ICD-10-PCS | Mod: TH,S$PBB,, | Performed by: OBSTETRICS & GYNECOLOGY

## 2019-06-20 PROCEDURE — 99999 PR PBB SHADOW E&M-EST. PATIENT-LVL II: CPT | Mod: PBBFAC,,, | Performed by: OBSTETRICS & GYNECOLOGY

## 2019-06-23 LAB — BACTERIA SPEC AEROBE CULT: NORMAL

## 2019-07-03 ENCOUNTER — ROUTINE PRENATAL (OUTPATIENT)
Dept: OBSTETRICS AND GYNECOLOGY | Facility: CLINIC | Age: 24
End: 2019-07-03
Payer: MEDICAID

## 2019-07-03 VITALS — WEIGHT: 181.69 LBS | DIASTOLIC BLOOD PRESSURE: 62 MMHG | SYSTOLIC BLOOD PRESSURE: 98 MMHG

## 2019-07-03 DIAGNOSIS — Z3A.37 37 WEEKS GESTATION OF PREGNANCY: Primary | ICD-10-CM

## 2019-07-03 PROCEDURE — 99999 PR PBB SHADOW E&M-EST. PATIENT-LVL II: CPT | Mod: PBBFAC,,, | Performed by: OBSTETRICS & GYNECOLOGY

## 2019-07-03 PROCEDURE — 99213 OFFICE O/P EST LOW 20 MIN: CPT | Mod: TH,S$PBB,, | Performed by: OBSTETRICS & GYNECOLOGY

## 2019-07-03 PROCEDURE — 99999 PR PBB SHADOW E&M-EST. PATIENT-LVL II: ICD-10-PCS | Mod: PBBFAC,,, | Performed by: OBSTETRICS & GYNECOLOGY

## 2019-07-03 PROCEDURE — 99212 OFFICE O/P EST SF 10 MIN: CPT | Mod: PBBFAC | Performed by: OBSTETRICS & GYNECOLOGY

## 2019-07-03 PROCEDURE — 99213 PR OFFICE/OUTPT VISIT, EST, LEVL III, 20-29 MIN: ICD-10-PCS | Mod: TH,S$PBB,, | Performed by: OBSTETRICS & GYNECOLOGY

## 2019-07-03 NOTE — PROGRESS NOTES
37w1d here for OB visit.  Pt of Dr. Hammond.  Pt has no major complaints today.  Reports active fetus.  Denies vaginal bleeding, leakage of fluid, or contractions.  Pt declined cervical exam.  Labor/preE precautions.  Kick counts.  Return 1 wks.  Voiced understanding.

## 2019-07-12 ENCOUNTER — ROUTINE PRENATAL (OUTPATIENT)
Dept: OBSTETRICS AND GYNECOLOGY | Facility: CLINIC | Age: 24
End: 2019-07-12
Payer: MEDICAID

## 2019-07-12 VITALS — WEIGHT: 186.19 LBS | SYSTOLIC BLOOD PRESSURE: 119 MMHG | DIASTOLIC BLOOD PRESSURE: 56 MMHG

## 2019-07-12 DIAGNOSIS — Z34.03 ENCOUNTER FOR SUPERVISION OF NORMAL FIRST PREGNANCY IN THIRD TRIMESTER: Primary | ICD-10-CM

## 2019-07-12 PROCEDURE — 99215 PR OFFICE/OUTPT VISIT, EST, LEVL V, 40-54 MIN: ICD-10-PCS | Mod: 25,S$PBB,TH, | Performed by: OBSTETRICS & GYNECOLOGY

## 2019-07-12 PROCEDURE — 99999 PR PBB SHADOW E&M-EST. PATIENT-LVL II: CPT | Mod: PBBFAC,,, | Performed by: OBSTETRICS & GYNECOLOGY

## 2019-07-12 PROCEDURE — 99999 PR PBB SHADOW E&M-EST. PATIENT-LVL II: ICD-10-PCS | Mod: PBBFAC,,, | Performed by: OBSTETRICS & GYNECOLOGY

## 2019-07-12 PROCEDURE — 99215 OFFICE O/P EST HI 40 MIN: CPT | Mod: 25,S$PBB,TH, | Performed by: OBSTETRICS & GYNECOLOGY

## 2019-07-12 PROCEDURE — 99212 OFFICE O/P EST SF 10 MIN: CPT | Mod: PBBFAC,TH | Performed by: OBSTETRICS & GYNECOLOGY

## 2019-07-12 NOTE — PROGRESS NOTES
Complaints today: Ms. Dupont reports that she is doing well with no complaints. Normal fetal movements with no vaginal bleeding, LOF or contractions at this time.       BP (!) 119/56   Wt 84.5 kg (186 lb 2.9 oz)   LMP  (LMP Unknown)     24 y.o., at 38w3d by Estimated Date of Delivery: 19  Patient Active Problem List   Diagnosis    Rubella vaccine exposure affecting pregnancy    Cervical polyp    Encounter for fetal anatomic survey     OB History    Para Term  AB Living   1             SAB TAB Ectopic Multiple Live Births                  # Outcome Date GA Lbr Estiven/2nd Weight Sex Delivery Anes PTL Lv   1 Current               Obstetric Comments   Every 30-45 days   Denies std   Never had pap       Dating reviewed    Allergies and problem list reviewed and updated    Medical and surgical history reviewed    Prenatal labs reviewed and updated    Physical Exam:  ABD: soft, gravid, nontender, 38cm    Assessment:  Diana was seen today for routine prenatal visit.    Diagnoses and all orders for this visit:    Encounter for supervision of normal first pregnancy in third trimester  - Discussed normal labor and bleeding precautions with patient  - Discussed preeclampsia precautions with patient   - Patient voiced understanding and all questions answered    No orders of the defined types were placed in this encounter.      Follow up in about 1 week (around 2019) for with Dr. Hammond.

## 2019-07-12 NOTE — PATIENT INSTRUCTIONS
Recognizing Labor    The beginning of labor is the beginning of birth. Youll start to feel strong contractions. Thats when the muscles of your uterus tighten up to help push your baby out during birth.  Yes, labor has probably started   Signs of labor include:  · Your contractions are getting stronger and more painful instead of weaker. Youll probably feel them throughout your whole uterus.  · Your contractions are regular. This means that you feel them about every 5 to 10 minutes. And they are getting closer together.  · You have pink-colored or blood-streaked fluid from your vagina.  · Your water breaks. It may be a gush or a slow trickle of clear fluid from your vagina.  No, its probably not real labor   Signs of false labor include:  · Your contractions arent regular or strong.  · You feel the contractions only in your lower uterus.  · Your contractions go away when you walk or change position.  · Your contractions go away after drinking fluids.  When to call your healthcare provider  Call your healthcare provider or clinic right away if you notice any of these signs:  · Fluid from your vagina, with or without contractions.  · Bleeding heavy enough that you need a sanitary pad.  · You dont feel your baby moving as much as before.     NOTE: Contractions are timed by both of these measures:  · The length of each contraction from its start to its finish.  · How far apart the contractions are -- the time between the start of one contraction and the start of the next contraction.   Date Last Reviewed: 8/9/2015  © 8067-0354 [employer]. [address] [place] [address], [place] [address]. All rights reserved. This information is not intended as a substitute for professional medical care. Always follow your healthcare professional's instructions.

## 2019-07-17 ENCOUNTER — ROUTINE PRENATAL (OUTPATIENT)
Dept: OBSTETRICS AND GYNECOLOGY | Facility: CLINIC | Age: 24
End: 2019-07-17
Payer: MEDICAID

## 2019-07-17 VITALS — SYSTOLIC BLOOD PRESSURE: 124 MMHG | WEIGHT: 182.75 LBS | DIASTOLIC BLOOD PRESSURE: 72 MMHG

## 2019-07-17 DIAGNOSIS — O26.843 UTERINE SIZE-DATE DISCREPANCY IN THIRD TRIMESTER: Primary | ICD-10-CM

## 2019-07-17 DIAGNOSIS — O99.891 RUBELLA VACCINE EXPOSURE AFFECTING PREGNANCY: ICD-10-CM

## 2019-07-17 DIAGNOSIS — Z20.4 RUBELLA VACCINE EXPOSURE AFFECTING PREGNANCY: ICD-10-CM

## 2019-07-17 DIAGNOSIS — Z3A.39 39 WEEKS GESTATION OF PREGNANCY: ICD-10-CM

## 2019-07-17 PROCEDURE — 99212 OFFICE O/P EST SF 10 MIN: CPT | Mod: PBBFAC,TH | Performed by: OBSTETRICS & GYNECOLOGY

## 2019-07-17 PROCEDURE — 99999 PR PBB SHADOW E&M-EST. PATIENT-LVL II: CPT | Mod: PBBFAC,,, | Performed by: OBSTETRICS & GYNECOLOGY

## 2019-07-17 PROCEDURE — 99213 PR OFFICE/OUTPT VISIT, EST, LEVL III, 20-29 MIN: ICD-10-PCS | Mod: TH,S$PBB,, | Performed by: OBSTETRICS & GYNECOLOGY

## 2019-07-17 PROCEDURE — 99999 PR PBB SHADOW E&M-EST. PATIENT-LVL II: ICD-10-PCS | Mod: PBBFAC,,, | Performed by: OBSTETRICS & GYNECOLOGY

## 2019-07-17 PROCEDURE — 99213 OFFICE O/P EST LOW 20 MIN: CPT | Mod: TH,S$PBB,, | Performed by: OBSTETRICS & GYNECOLOGY

## 2019-07-18 RX ORDER — SODIUM CHLORIDE 9 MG/ML
INJECTION, SOLUTION INTRAVENOUS
Status: CANCELLED | OUTPATIENT
Start: 2019-07-18

## 2019-07-18 RX ORDER — MISOPROSTOL 100 UG/1
600 TABLET ORAL
Status: CANCELLED | OUTPATIENT
Start: 2019-07-18

## 2019-07-18 RX ORDER — SODIUM CHLORIDE, SODIUM LACTATE, POTASSIUM CHLORIDE, CALCIUM CHLORIDE 600; 310; 30; 20 MG/100ML; MG/100ML; MG/100ML; MG/100ML
INJECTION, SOLUTION INTRAVENOUS CONTINUOUS
Status: CANCELLED | OUTPATIENT
Start: 2019-07-18

## 2019-07-18 RX ORDER — MORPHINE SULFATE 10 MG/ML
4 INJECTION, SOLUTION INTRAMUSCULAR; INTRAVENOUS EVERY 4 HOURS PRN
Status: CANCELLED | OUTPATIENT
Start: 2019-07-18

## 2019-07-18 RX ORDER — ONDANSETRON 4 MG/1
8 TABLET, ORALLY DISINTEGRATING ORAL EVERY 8 HOURS PRN
Status: CANCELLED | OUTPATIENT
Start: 2019-07-18

## 2019-07-18 NOTE — PROGRESS NOTES
Here for routine OB visit  Denies VB/ctx/LOF. Active FM  Denies PreE symptoms/UTI symptoms  ROS: Negative except feeling uncomfortable    General:  NAD, AxO x 3  Abdomen: soft, gravid, non-tender  Ext: peripheral pulses normal, no pedal edema, no clubbing or cyanosis  Skin: normal, good color, good turgor and no lesions  Sve: close/th/high, cephalic by US    Size > date:    Abd US done, sviup, anterior placenta, cephalic, EFW 77% (3751g), 8lb  4oz.   BPD 9.54 cm  HC 34.04 cm  AC 36.11  FL 7.76    BRANDON visually adequate for gestation age    IOL scheduled for 7/21/19 @ 12pm - cervadil    Strict Labor precautions, FKCs

## 2019-07-19 NOTE — H&P
Admission History and Physical to Labor and Delivery  Primary Ob-Gyn: Dr. Hammond    Subjective:   Diana Dupont is a 24 y.o.  female @ 39w5d by LMP consistent with early ultrasound, who is admitted for elective induction of labor.    Patient reports no complaints. Fetal Movement: normal.     Her current obstetrical history is significant for exposure to MMR vaccine during first trimester.    Pertinent prenatal labs:  Rh Negative: no  GBS Positive: no  Rubella Non-Immune: no    No past medical history on file.  No past surgical history on file.  OB History    Para Term  AB Living   1 0 0 0 0 0   SAB TAB Ectopic Multiple Live Births   0 0 0 0 0      # Outcome Date GA Lbr Estiven/2nd Weight Sex Delivery Anes PTL Lv   1 Current               Obstetric Comments   Every 30-45 days   Denies std   Never had pap     Prior OB complications: none  No family history on file.  Social History     Socioeconomic History    Marital status:      Spouse name: Not on file    Number of children: Not on file    Years of education: Not on file    Highest education level: Not on file   Occupational History    Not on file   Social Needs    Financial resource strain: Not on file    Food insecurity:     Worry: Not on file     Inability: Not on file    Transportation needs:     Medical: Not on file     Non-medical: Not on file   Tobacco Use    Smoking status: Never Smoker    Smokeless tobacco: Never Used   Substance and Sexual Activity    Alcohol use: No     Frequency: Never    Drug use: No    Sexual activity: Yes     Partners: Male     Birth control/protection: None   Lifestyle    Physical activity:     Days per week: Not on file     Minutes per session: Not on file    Stress: Not on file   Relationships    Social connections:     Talks on phone: Not on file     Gets together: Not on file     Attends Anabaptist service: Not on file     Active member of club or organization: Not on file     Attends meetings of  clubs or organizations: Not on file     Relationship status: Not on file   Other Topics Concern    Not on file   Social History Narrative    Not on file       Objective:     Vital signs in last 24 hours:  [unfilled]    General: alert, appears stated age and cooperative  Skin: normal  HEENT: trachea midline  Lungs: non-labored  Heart: and regular rate  Abdomen: soft, non-distended, gravid with non-tender fundus  Extremity: trace edema, no evidence of DVT  FHT: 135 BPM   Uterine Size: size greater than dates  leopold ~ 8.5lb  Presentations: cephalic  Cervix:  Dilation: 0 CM  Effacement: thick  Station: -3  Consistency: medium  Position:  middle     Dilation: Closed  Presentation: Vertex    Lab Review     No visits with results within 1 Day(s) from this visit.   Latest known visit with results is:   Routine Prenatal on 06/20/2019   Component Date Value Ref Range Status    Strep B Culture 06/20/2019 No Group B Streptococcus isolated   Final       Assessment/Plan:  1.  Term pregnancy: plan for cervidil cervical ripening follow with pitocin    Anticipate vaginal delivery.      Risks, benefits, alternatives and possible complications have been discussed in detail with the patient. Pre-admission, admission, and post admission procedures and expectations were discussed in detail. All questions answered, all appropriate consents will be signed.    Teressa Hammond

## 2019-07-21 ENCOUNTER — HOSPITAL ENCOUNTER (INPATIENT)
Facility: HOSPITAL | Age: 24
LOS: 6 days | Discharge: HOME | End: 2019-07-27
Attending: OBSTETRICS & GYNECOLOGY | Admitting: OBSTETRICS & GYNECOLOGY
Payer: MEDICAID

## 2019-07-21 DIAGNOSIS — O26.843 UTERINE SIZE-DATE DISCREPANCY IN THIRD TRIMESTER: Primary | ICD-10-CM

## 2019-07-21 DIAGNOSIS — Z98.891 S/P CESAREAN SECTION: ICD-10-CM

## 2019-07-21 LAB
ABO + RH BLD: NORMAL
BASOPHILS # BLD AUTO: 0.02 K/UL (ref 0–0.2)
BASOPHILS NFR BLD: 0.2 % (ref 0–1.9)
BLD GP AB SCN CELLS X3 SERPL QL: NORMAL
DIFFERENTIAL METHOD: ABNORMAL
EOSINOPHIL # BLD AUTO: 0.1 K/UL (ref 0–0.5)
EOSINOPHIL NFR BLD: 0.9 % (ref 0–8)
ERYTHROCYTE [DISTWIDTH] IN BLOOD BY AUTOMATED COUNT: 14.1 % (ref 11.5–14.5)
HCT VFR BLD AUTO: 37.7 % (ref 37–48.5)
HGB BLD-MCNC: 12.4 G/DL (ref 12–16)
LYMPHOCYTES # BLD AUTO: 1.6 K/UL (ref 1–4.8)
LYMPHOCYTES NFR BLD: 16.8 % (ref 18–48)
MCH RBC QN AUTO: 29.5 PG (ref 27–31)
MCHC RBC AUTO-ENTMCNC: 32.9 G/DL (ref 32–36)
MCV RBC AUTO: 90 FL (ref 82–98)
MONOCYTES # BLD AUTO: 0.7 K/UL (ref 0.3–1)
MONOCYTES NFR BLD: 7.3 % (ref 4–15)
NEUTROPHILS # BLD AUTO: 7 K/UL (ref 1.8–7.7)
NEUTROPHILS NFR BLD: 74.8 % (ref 38–73)
PLATELET # BLD AUTO: 258 K/UL (ref 150–350)
PMV BLD AUTO: 9.4 FL (ref 9.2–12.9)
RBC # BLD AUTO: 4.21 M/UL (ref 4–5.4)
WBC # BLD AUTO: 9.49 K/UL (ref 3.9–12.7)

## 2019-07-21 PROCEDURE — 25000003 PHARM REV CODE 250: Performed by: OBSTETRICS & GYNECOLOGY

## 2019-07-21 PROCEDURE — 72100002 HC LABOR CARE, 1ST 8 HOURS

## 2019-07-21 PROCEDURE — 86850 RBC ANTIBODY SCREEN: CPT

## 2019-07-21 PROCEDURE — 85025 COMPLETE CBC W/AUTO DIFF WBC: CPT

## 2019-07-21 PROCEDURE — 11000001 HC ACUTE MED/SURG PRIVATE ROOM

## 2019-07-21 PROCEDURE — 36415 COLL VENOUS BLD VENIPUNCTURE: CPT

## 2019-07-21 RX ORDER — TERBUTALINE SULFATE 1 MG/ML
0.25 INJECTION SUBCUTANEOUS
Status: DISCONTINUED | OUTPATIENT
Start: 2019-07-21 | End: 2019-07-23

## 2019-07-21 RX ORDER — SODIUM CHLORIDE 9 MG/ML
INJECTION, SOLUTION INTRAVENOUS
Status: DISCONTINUED | OUTPATIENT
Start: 2019-07-21 | End: 2019-07-23

## 2019-07-21 RX ORDER — OXYTOCIN/RINGER'S LACTATE 20/1000 ML
333 PLASTIC BAG, INJECTION (ML) INTRAVENOUS CONTINUOUS
Status: ACTIVE | OUTPATIENT
Start: 2019-07-21 | End: 2019-07-21

## 2019-07-21 RX ORDER — SODIUM CHLORIDE, SODIUM LACTATE, POTASSIUM CHLORIDE, CALCIUM CHLORIDE 600; 310; 30; 20 MG/100ML; MG/100ML; MG/100ML; MG/100ML
INJECTION, SOLUTION INTRAVENOUS CONTINUOUS
Status: DISCONTINUED | OUTPATIENT
Start: 2019-07-21 | End: 2019-07-23

## 2019-07-21 RX ORDER — ONDANSETRON 8 MG/1
8 TABLET, ORALLY DISINTEGRATING ORAL EVERY 8 HOURS PRN
Status: DISCONTINUED | OUTPATIENT
Start: 2019-07-21 | End: 2019-07-21

## 2019-07-21 RX ORDER — MORPHINE SULFATE 10 MG/ML
4 INJECTION INTRAMUSCULAR; INTRAVENOUS; SUBCUTANEOUS EVERY 4 HOURS PRN
Status: DISCONTINUED | OUTPATIENT
Start: 2019-07-21 | End: 2019-07-23

## 2019-07-21 RX ORDER — SODIUM CHLORIDE, SODIUM LACTATE, POTASSIUM CHLORIDE, CALCIUM CHLORIDE 600; 310; 30; 20 MG/100ML; MG/100ML; MG/100ML; MG/100ML
INJECTION, SOLUTION INTRAVENOUS CONTINUOUS
Status: DISCONTINUED | OUTPATIENT
Start: 2019-07-22 | End: 2019-07-23

## 2019-07-21 RX ORDER — ONDANSETRON 8 MG/1
8 TABLET, ORALLY DISINTEGRATING ORAL EVERY 8 HOURS PRN
Status: DISCONTINUED | OUTPATIENT
Start: 2019-07-21 | End: 2019-07-23

## 2019-07-21 RX ORDER — MISOPROSTOL 100 MCG
25 TABLET ORAL EVERY 4 HOURS
Status: ACTIVE | OUTPATIENT
Start: 2019-07-21 | End: 2019-07-22

## 2019-07-21 RX ORDER — OXYTOCIN/RINGER'S LACTATE 20/1000 ML
2 PLASTIC BAG, INJECTION (ML) INTRAVENOUS CONTINUOUS
Status: DISCONTINUED | OUTPATIENT
Start: 2019-07-22 | End: 2019-07-23

## 2019-07-21 RX ORDER — OXYTOCIN/RINGER'S LACTATE 20/1000 ML
41.7 PLASTIC BAG, INJECTION (ML) INTRAVENOUS CONTINUOUS
Status: DISCONTINUED | OUTPATIENT
Start: 2019-07-21 | End: 2019-07-21

## 2019-07-21 RX ORDER — OXYTOCIN/RINGER'S LACTATE 20/1000 ML
41.7 PLASTIC BAG, INJECTION (ML) INTRAVENOUS CONTINUOUS
Status: ACTIVE | OUTPATIENT
Start: 2019-07-21 | End: 2019-07-21

## 2019-07-21 RX ORDER — MISOPROSTOL 200 UG/1
600 TABLET ORAL
Status: DISCONTINUED | OUTPATIENT
Start: 2019-07-21 | End: 2019-07-23

## 2019-07-21 RX ADMIN — DINOPROSTONE 10 MG: 10 INSERT VAGINAL at 06:07

## 2019-07-21 RX ADMIN — SODIUM CHLORIDE, SODIUM LACTATE, POTASSIUM CHLORIDE, AND CALCIUM CHLORIDE 1000 ML: .6; .31; .03; .02 INJECTION, SOLUTION INTRAVENOUS at 06:07

## 2019-07-21 RX ADMIN — SODIUM CHLORIDE, SODIUM LACTATE, POTASSIUM CHLORIDE, AND CALCIUM CHLORIDE: .6; .31; .03; .02 INJECTION, SOLUTION INTRAVENOUS at 04:07

## 2019-07-22 PROCEDURE — 63600175 PHARM REV CODE 636 W HCPCS: Performed by: OBSTETRICS & GYNECOLOGY

## 2019-07-22 PROCEDURE — 72100003 HC LABOR CARE, EA. ADDL. 8 HRS

## 2019-07-22 PROCEDURE — 59025 PR FETAL 2N-STRESS TEST: ICD-10-PCS | Mod: 26,,, | Performed by: OBSTETRICS & GYNECOLOGY

## 2019-07-22 PROCEDURE — 11000001 HC ACUTE MED/SURG PRIVATE ROOM

## 2019-07-22 PROCEDURE — 25000003 PHARM REV CODE 250: Performed by: OBSTETRICS & GYNECOLOGY

## 2019-07-22 PROCEDURE — 59025 FETAL NON-STRESS TEST: CPT | Mod: 26,,, | Performed by: OBSTETRICS & GYNECOLOGY

## 2019-07-22 RX ORDER — MISOPROSTOL 100 MCG
25 TABLET ORAL EVERY 4 HOURS
Status: DISPENSED | OUTPATIENT
Start: 2019-07-22 | End: 2019-07-22

## 2019-07-22 RX ORDER — MORPHINE SULFATE 10 MG/ML
4 INJECTION INTRAMUSCULAR; INTRAVENOUS; SUBCUTANEOUS EVERY 6 HOURS PRN
Status: DISCONTINUED | OUTPATIENT
Start: 2019-07-22 | End: 2019-07-23

## 2019-07-22 RX ORDER — MISOPROSTOL 100 MCG
25 TABLET ORAL ONCE
Status: DISCONTINUED | OUTPATIENT
Start: 2019-07-22 | End: 2019-07-23

## 2019-07-22 RX ADMIN — Medication 25 MCG: at 08:07

## 2019-07-22 RX ADMIN — MORPHINE SULFATE 4 MG: 10 INJECTION INTRAVENOUS at 05:07

## 2019-07-22 RX ADMIN — SODIUM CHLORIDE, SODIUM LACTATE, POTASSIUM CHLORIDE, AND CALCIUM CHLORIDE: .6; .31; .03; .02 INJECTION, SOLUTION INTRAVENOUS at 07:07

## 2019-07-22 RX ADMIN — Medication 25 MCG: at 12:07

## 2019-07-22 RX ADMIN — SODIUM CHLORIDE, SODIUM LACTATE, POTASSIUM CHLORIDE, AND CALCIUM CHLORIDE: .6; .31; .03; .02 INJECTION, SOLUTION INTRAVENOUS at 09:07

## 2019-07-22 RX ADMIN — Medication 2 MILLI-UNITS/MIN: at 06:07

## 2019-07-22 RX ADMIN — Medication 2 MILLI-UNITS/MIN: at 07:07

## 2019-07-22 RX ADMIN — SODIUM CHLORIDE, SODIUM LACTATE, POTASSIUM CHLORIDE, AND CALCIUM CHLORIDE: .6; .31; .03; .02 INJECTION, SOLUTION INTRAVENOUS at 05:07

## 2019-07-22 RX ADMIN — PROMETHAZINE HYDROCHLORIDE 12.5 MG: 25 INJECTION INTRAMUSCULAR; INTRAVENOUS at 05:07

## 2019-07-22 NOTE — TREATMENT PLAN
KELLEN Ryder RN notified Dr. Hammond of a pt safety issue on the unit due to lack of staff and the inability for us to start the Cytotec medication for induction. Offered MD to start Cervidil at this time. MD agreed to proceed with Cervidil and Pitocin in the am.

## 2019-07-22 NOTE — TREATMENT PLAN
Pt arrived to unit for scheduled induction. EFMx2 placed, positive FHT noted. POC reviewed with pt, verbalizes understanding.

## 2019-07-22 NOTE — TREATMENT PLAN
Dr. Hammond notified of pts late arrival for induction and no order for induction medication. SVE shows closed, thick, and high. Ordered to start Cytotec for 3 doses and then Pitocin in the am.

## 2019-07-22 NOTE — TREATMENT PLAN
Dr. Hammond phoned unit for update on pt, no new orders at this time. Will come to unit and see pt and place next Cytotec dose.

## 2019-07-22 NOTE — PROGRESS NOTES
[facility]  Obstetrics  Labor Progress Note    Patient Name: Diana Dupont  MRN: [id number]  Admission Date: 2019  Hospital Length of Stay: 1 days  Attending Physician: Teressa Lawson Mai, MD  Primary Care Provider: Primary Doctor No    Subjective:     Principal Problem:<principal problem not specified>    Hospital Course:  Torsten wright a 23 yo  @ 39w6d presented to L&D for IOL  On admission she was 0.5 dilated, cervadil was placed but exam was unchanged.  cytotec was given x 1 dose, pt is 1 cm/thich/high.     Interval History:  Diana is a 24 y.o.  at 39w6d. She is doing well. Mild cramping    Objective:     Vital Signs (Most Recent):  Temp: 98.4 °F (36.9 °C) (19 0708)  Pulse: 86 (19 1154)  Resp: 18 (19 0708)  BP: (!) 98/53 (19 1059)  SpO2: (!) 94 % (19 1154) Vital Signs (24h Range):  Temp:  [98.4 °F (36.9 °C)-98.7 °F (37.1 °C)] 98.4 °F (36.9 °C)  Pulse:  [] 86  Resp:  [17-18] 18  SpO2:  [88 %-99 %] 94 %  BP: ()/(50-79) 98/53     Weight: 83.9 kg (185 lb)  Body mass index is 31.76 kg/m².    FHT: 130 bpm, mod carole, +accels. No decels, Cat 1 (reassuring)  TOCO:  Q 3-6 minutes    Cervical Exam:  Dilation:  1  Effacement:  25%  Station: -3  Presentation: Vertex     Significant Labs:  Lab Results   Component Value Date    GROUPTRH A POS 2019    HEPBSAG Negative 2019    HEPBSAG Negative 2019    STREPBCULT No Group B Streptococcus isolated 2019       I have personallly reviewed all pertinent lab results from the last 24 hours.    Physical Exam:   Constitutional: She is oriented to person, place, and time. She appears well-developed and well-nourished.    HENT:   Head: Normocephalic and atraumatic.    Eyes: Pupils are equal, round, and reactive to light. EOM are normal.     Cardiovascular: Exam reveals no clubbing and no cyanosis.     Pulmonary/Chest: Effort normal.        Abdominal: Soft. She exhibits no mass. There is no rebound and no  guarding. No hernia.   Leopold # 8.5 lb             Musculoskeletal: Normal range of motion and moves all extremeties.       Neurological: She is alert and oriented to person, place, and time.    Skin: Skin is warm. No cyanosis. Nails show no clubbing.    Psychiatric: She has a normal mood and affect. Her behavior is normal. Thought content normal.       Assessment/Plan:     24 y.o. female  at 39w6d for:    Uterine size-date discrepancy in third trimester  Continue cytotec, will reassess after 2nd dose, maybe candidate for marie bulb induction with pitocin          Teressa Hammond MD  Obstetrics  [facility]

## 2019-07-22 NOTE — NURSING
Dr. Hammond @ bs for eval, bs ultrasound done for confirmation of fetal presentation, vtx confirmed. SVE done 1/hi.

## 2019-07-22 NOTE — HOSPITAL COURSE
Pt is a 23 yo  @ 39w6d presented to L&D for IOL  On admission she was 0.5 dilated, cervadil was placed but exam was unchanged.  S/p cytotec x 2 with marie bulb,   Pt dilated to 5/70/-2 and AROM with clear fluid at 0030 on 19  There was occasional late decels therefore pitocin was stopped twice overnight  Pt underwent a primary LTCD for failed IOL and fetal intolerance to labor on 19  Pt was started on Gent/clinda for IAI after delivery  19 - POD # 2, still have fever, every 24 hours, 100.5 at 4am  19 -pod # 3, doing well no fever since 19 pm, passing more flatus - feeling better

## 2019-07-22 NOTE — ASSESSMENT & PLAN NOTE
Continue cytotec, will reassess after 2nd dose, maybe candidate for marie bulb induction with pitocin

## 2019-07-22 NOTE — PLAN OF CARE
FHT:  130 bpm, mod carole, + accels, no decels  [place]ick: q 2-4 min  Sve: 1.5/50/-3 vertex.  Cervix mid position, medium to soft consistency  Cook catheter placed with 40 cc in uterine balloon and 40 cc in vaginal balloon  Pt tolerated well  Ok for pt to have CLD now  Will start pitocin after

## 2019-07-22 NOTE — SUBJECTIVE & OBJECTIVE
Interval History:  Diana is a 24 y.o.  at 39w6d. She is doing well. Mild cramping    Objective:     Vital Signs (Most Recent):  Temp: 98.4 °F (36.9 °C) (19 0708)  Pulse: 86 (19 1154)  Resp: 18 (19 0708)  BP: (!) 98/53 (19 1059)  SpO2: (!) 94 % (19 1154) Vital Signs (24h Range):  Temp:  [98.4 °F (36.9 °C)-98.7 °F (37.1 °C)] 98.4 °F (36.9 °C)  Pulse:  [] 86  Resp:  [17-18] 18  SpO2:  [88 %-99 %] 94 %  BP: ()/(50-79) 98/53     Weight: 83.9 kg (185 lb)  Body mass index is 31.76 kg/m².    FHT: 130 bpm, mod carole, +accels. No decels, Cat 1 (reassuring)  TOCO:  Q 3-6 minutes    Cervical Exam:  Dilation:  1  Effacement:  25%  Station: -3  Presentation: Vertex     Significant Labs:  Lab Results   Component Value Date    GROUPTRH A POS 2019    HEPBSAG Negative 2019    HEPBSAG Negative 2019    STREPBCULT No Group B Streptococcus isolated 2019       I have personallly reviewed all pertinent lab results from the last 24 hours.    Physical Exam:   Constitutional: She is oriented to person, place, and time. She appears well-developed and well-nourished.    HENT:   Head: Normocephalic and atraumatic.    Eyes: Pupils are equal, round, and reactive to light. EOM are normal.     Cardiovascular: Exam reveals no clubbing and no cyanosis.     Pulmonary/Chest: Effort normal.        Abdominal: Soft. She exhibits no mass. There is no rebound and no guarding. No hernia.   Leopold # 8.5 lb             Musculoskeletal: Normal range of motion and moves all extremeties.       Neurological: She is alert and oriented to person, place, and time.    Skin: Skin is warm. No cyanosis. Nails show no clubbing.    Psychiatric: She has a normal mood and affect. Her behavior is normal. Thought content normal.

## 2019-07-23 ENCOUNTER — ANESTHESIA EVENT (OUTPATIENT)
Dept: OBSTETRICS AND GYNECOLOGY | Facility: HOSPITAL | Age: 24
End: 2019-07-23
Payer: MEDICAID

## 2019-07-23 ENCOUNTER — ANESTHESIA (OUTPATIENT)
Dept: OBSTETRICS AND GYNECOLOGY | Facility: HOSPITAL | Age: 24
End: 2019-07-23
Payer: MEDICAID

## 2019-07-23 PROCEDURE — 36000684 HC CESAREAN SECTION, UNSCHEDULED

## 2019-07-23 PROCEDURE — 11000001 HC ACUTE MED/SURG PRIVATE ROOM

## 2019-07-23 PROCEDURE — 25000003 PHARM REV CODE 250: Performed by: ANESTHESIOLOGY

## 2019-07-23 PROCEDURE — 25000003 PHARM REV CODE 250: Performed by: OBSTETRICS & GYNECOLOGY

## 2019-07-23 PROCEDURE — S0020 INJECTION, BUPIVICAINE HYDRO: HCPCS | Performed by: NURSE ANESTHETIST, CERTIFIED REGISTERED

## 2019-07-23 PROCEDURE — 27200710 HC EPIDURAL INFUSION PUMP SET: Performed by: ANESTHESIOLOGY

## 2019-07-23 PROCEDURE — 25000003 PHARM REV CODE 250: Performed by: NURSE ANESTHETIST, CERTIFIED REGISTERED

## 2019-07-23 PROCEDURE — 51702 INSERT TEMP BLADDER CATH: CPT

## 2019-07-23 PROCEDURE — S0077 INJECTION, CLINDAMYCIN PHOSP: HCPCS | Performed by: OBSTETRICS & GYNECOLOGY

## 2019-07-23 PROCEDURE — 01968 PR INSERT CATH,ART,PERCUT,SHORTTERM: ICD-10-PCS | Mod: QY,ANES,, | Performed by: ANESTHESIOLOGY

## 2019-07-23 PROCEDURE — S0020 INJECTION, BUPIVICAINE HYDRO: HCPCS | Performed by: ANESTHESIOLOGY

## 2019-07-23 PROCEDURE — 63600175 PHARM REV CODE 636 W HCPCS: Performed by: NURSE ANESTHETIST, CERTIFIED REGISTERED

## 2019-07-23 PROCEDURE — 62326 NJX INTERLAMINAR LMBR/SAC: CPT | Performed by: ANESTHESIOLOGY

## 2019-07-23 PROCEDURE — S0028 INJECTION, FAMOTIDINE, 20 MG: HCPCS | Performed by: ANESTHESIOLOGY

## 2019-07-23 PROCEDURE — 63600175 PHARM REV CODE 636 W HCPCS: Performed by: ANESTHESIOLOGY

## 2019-07-23 PROCEDURE — 37000009 HC ANESTHESIA EA ADD 15 MINS: Performed by: OBSTETRICS & GYNECOLOGY

## 2019-07-23 PROCEDURE — 01968 ANES/ANALG CS DLVR NEURAXIAL: CPT | Mod: QY,ANES,, | Performed by: ANESTHESIOLOGY

## 2019-07-23 PROCEDURE — 59514 PR CESAREAN DELIVERY ONLY: ICD-10-PCS | Mod: GB,,, | Performed by: OBSTETRICS & GYNECOLOGY

## 2019-07-23 PROCEDURE — 01968 ANES/ANALG CS DLVR NEURAXIAL: CPT | Mod: QX,CRNA,, | Performed by: NURSE ANESTHETIST, CERTIFIED REGISTERED

## 2019-07-23 PROCEDURE — 59409 OBSTETRICAL CARE: CPT | Mod: QX,CRNA,, | Performed by: NURSE ANESTHETIST, CERTIFIED REGISTERED

## 2019-07-23 PROCEDURE — 27000181 HC CABLE, IUPC

## 2019-07-23 PROCEDURE — 37000008 HC ANESTHESIA 1ST 15 MINUTES: Performed by: OBSTETRICS & GYNECOLOGY

## 2019-07-23 PROCEDURE — 59514 CESAREAN DELIVERY ONLY: CPT | Mod: 80,GB,, | Performed by: OBSTETRICS & GYNECOLOGY

## 2019-07-23 PROCEDURE — 59409 OBSTETRICAL CARE: CPT | Mod: QY,ANES,, | Performed by: ANESTHESIOLOGY

## 2019-07-23 PROCEDURE — 59409 PRA ETRICAL CARE,VAG DELIV ONLY: ICD-10-PCS | Mod: QX,CRNA,, | Performed by: NURSE ANESTHETIST, CERTIFIED REGISTERED

## 2019-07-23 PROCEDURE — 59514 CESAREAN DELIVERY ONLY: CPT | Mod: GB,,, | Performed by: OBSTETRICS & GYNECOLOGY

## 2019-07-23 PROCEDURE — 59409 PRA ETRICAL CARE,VAG DELIV ONLY: ICD-10-PCS | Mod: QY,ANES,, | Performed by: ANESTHESIOLOGY

## 2019-07-23 PROCEDURE — 72100003 HC LABOR CARE, EA. ADDL. 8 HRS

## 2019-07-23 PROCEDURE — 59514 PR CESAREAN DELIVERY ONLY: ICD-10-PCS | Mod: 80,GB,, | Performed by: OBSTETRICS & GYNECOLOGY

## 2019-07-23 PROCEDURE — 01968 PR INSERT CATH,ART,PERCUT,SHORTTERM: ICD-10-PCS | Mod: QX,CRNA,, | Performed by: NURSE ANESTHETIST, CERTIFIED REGISTERED

## 2019-07-23 PROCEDURE — 27800517 HC TRAY,EPIDURAL-CONTINUOUS: Performed by: ANESTHESIOLOGY

## 2019-07-23 PROCEDURE — 63600175 PHARM REV CODE 636 W HCPCS: Performed by: OBSTETRICS & GYNECOLOGY

## 2019-07-23 RX ORDER — FENTANYL/BUPIVACAINE/NS/PF 2MCG/ML-.1
PLASTIC BAG, INJECTION (ML) INJECTION CONTINUOUS PRN
Status: DISCONTINUED | OUTPATIENT
Start: 2019-07-23 | End: 2019-07-23

## 2019-07-23 RX ORDER — FENTANYL CITRATE 50 UG/ML
INJECTION, SOLUTION INTRAMUSCULAR; INTRAVENOUS
Status: DISCONTINUED | OUTPATIENT
Start: 2019-07-23 | End: 2019-07-23

## 2019-07-23 RX ORDER — MIDAZOLAM HYDROCHLORIDE 1 MG/ML
INJECTION INTRAMUSCULAR; INTRAVENOUS
Status: DISCONTINUED | OUTPATIENT
Start: 2019-07-23 | End: 2019-07-23

## 2019-07-23 RX ORDER — METOCLOPRAMIDE HYDROCHLORIDE 5 MG/ML
10 INJECTION INTRAMUSCULAR; INTRAVENOUS ONCE
Status: COMPLETED | OUTPATIENT
Start: 2019-07-23 | End: 2019-07-23

## 2019-07-23 RX ORDER — HYDROCORTISONE 25 MG/G
CREAM TOPICAL 3 TIMES DAILY PRN
Status: DISCONTINUED | OUTPATIENT
Start: 2019-07-23 | End: 2019-07-27 | Stop reason: HOSPADM

## 2019-07-23 RX ORDER — AMOXICILLIN 250 MG
1 CAPSULE ORAL NIGHTLY PRN
Status: DISCONTINUED | OUTPATIENT
Start: 2019-07-23 | End: 2019-07-27 | Stop reason: HOSPADM

## 2019-07-23 RX ORDER — DOCUSATE SODIUM 100 MG/1
200 CAPSULE, LIQUID FILLED ORAL 2 TIMES DAILY
Status: DISCONTINUED | OUTPATIENT
Start: 2019-07-23 | End: 2019-07-27 | Stop reason: HOSPADM

## 2019-07-23 RX ORDER — ONDANSETRON 8 MG/1
8 TABLET, ORALLY DISINTEGRATING ORAL EVERY 8 HOURS PRN
Status: DISCONTINUED | OUTPATIENT
Start: 2019-07-23 | End: 2019-07-27 | Stop reason: HOSPADM

## 2019-07-23 RX ORDER — LIDOCAINE HYDROCHLORIDE AND EPINEPHRINE 15; 5 MG/ML; UG/ML
INJECTION, SOLUTION EPIDURAL
Status: DISCONTINUED | OUTPATIENT
Start: 2019-07-23 | End: 2019-07-23

## 2019-07-23 RX ORDER — SIMETHICONE 80 MG
1 TABLET,CHEWABLE ORAL EVERY 6 HOURS PRN
Status: DISCONTINUED | OUTPATIENT
Start: 2019-07-23 | End: 2019-07-24

## 2019-07-23 RX ORDER — LIDOCAINE HCL/EPINEPHRINE/PF 2%-1:200K
VIAL (ML) INJECTION
Status: DISCONTINUED | OUTPATIENT
Start: 2019-07-23 | End: 2019-07-23

## 2019-07-23 RX ORDER — HYDROCODONE BITARTRATE AND ACETAMINOPHEN 7.5; 325 MG/1; MG/1
1 TABLET ORAL EVERY 4 HOURS PRN
Status: DISCONTINUED | OUTPATIENT
Start: 2019-07-23 | End: 2019-07-27 | Stop reason: HOSPADM

## 2019-07-23 RX ORDER — ADHESIVE BANDAGE
30 BANDAGE TOPICAL 2 TIMES DAILY PRN
Status: DISCONTINUED | OUTPATIENT
Start: 2019-07-24 | End: 2019-07-27 | Stop reason: HOSPADM

## 2019-07-23 RX ORDER — MUPIROCIN 20 MG/G
1 OINTMENT TOPICAL 2 TIMES DAILY
Status: DISCONTINUED | OUTPATIENT
Start: 2019-07-23 | End: 2019-07-27 | Stop reason: HOSPADM

## 2019-07-23 RX ORDER — ACETAMINOPHEN 325 MG/1
650 TABLET ORAL EVERY 6 HOURS PRN
Status: DISCONTINUED | OUTPATIENT
Start: 2019-07-23 | End: 2019-07-23

## 2019-07-23 RX ORDER — NALOXONE HCL 0.4 MG/ML
0.02 VIAL (ML) INJECTION
Status: DISCONTINUED | OUTPATIENT
Start: 2019-07-23 | End: 2019-07-27 | Stop reason: HOSPADM

## 2019-07-23 RX ORDER — OXYTOCIN 10 [USP'U]/ML
INJECTION, SOLUTION INTRAMUSCULAR; INTRAVENOUS
Status: DISCONTINUED | OUTPATIENT
Start: 2019-07-23 | End: 2019-07-23

## 2019-07-23 RX ORDER — SODIUM CHLORIDE, SODIUM LACTATE, POTASSIUM CHLORIDE, CALCIUM CHLORIDE 600; 310; 30; 20 MG/100ML; MG/100ML; MG/100ML; MG/100ML
INJECTION, SOLUTION INTRAVENOUS CONTINUOUS
Status: ACTIVE | OUTPATIENT
Start: 2019-07-23 | End: 2019-07-24

## 2019-07-23 RX ORDER — PHENYLEPHRINE HYDROCHLORIDE 10 MG/ML
INJECTION INTRAVENOUS
Status: DISCONTINUED | OUTPATIENT
Start: 2019-07-23 | End: 2019-07-23

## 2019-07-23 RX ORDER — IBUPROFEN 600 MG/1
600 TABLET ORAL EVERY 6 HOURS PRN
Status: DISCONTINUED | OUTPATIENT
Start: 2019-07-23 | End: 2019-07-27 | Stop reason: HOSPADM

## 2019-07-23 RX ORDER — HYDROMORPHONE HCL IN 0.9% NACL 6 MG/30 ML
PATIENT CONTROLLED ANALGESIA SYRINGE INTRAVENOUS CONTINUOUS
Status: DISCONTINUED | OUTPATIENT
Start: 2019-07-23 | End: 2019-07-27 | Stop reason: HOSPADM

## 2019-07-23 RX ORDER — DIPHENHYDRAMINE HCL 25 MG
25 CAPSULE ORAL EVERY 4 HOURS PRN
Status: DISCONTINUED | OUTPATIENT
Start: 2019-07-23 | End: 2019-07-27 | Stop reason: HOSPADM

## 2019-07-23 RX ORDER — FAMOTIDINE 10 MG/ML
20 INJECTION INTRAVENOUS ONCE
Status: COMPLETED | OUTPATIENT
Start: 2019-07-23 | End: 2019-07-23

## 2019-07-23 RX ORDER — KETOROLAC TROMETHAMINE 30 MG/ML
30 INJECTION, SOLUTION INTRAMUSCULAR; INTRAVENOUS ONCE
Status: COMPLETED | OUTPATIENT
Start: 2019-07-23 | End: 2019-07-24

## 2019-07-23 RX ORDER — SODIUM CITRATE AND CITRIC ACID MONOHYDRATE 334; 500 MG/5ML; MG/5ML
30 SOLUTION ORAL ONCE
Status: COMPLETED | OUTPATIENT
Start: 2019-07-23 | End: 2019-07-23

## 2019-07-23 RX ORDER — BUPIVACAINE HYDROCHLORIDE 2.5 MG/ML
INJECTION, SOLUTION INFILTRATION; PERINEURAL
Status: COMPLETED | OUTPATIENT
Start: 2019-07-23 | End: 2019-07-23

## 2019-07-23 RX ORDER — BUPIVACAINE HYDROCHLORIDE 5 MG/ML
INJECTION, SOLUTION EPIDURAL; INTRACAUDAL
Status: DISCONTINUED | OUTPATIENT
Start: 2019-07-23 | End: 2019-07-23

## 2019-07-23 RX ORDER — OXYCODONE AND ACETAMINOPHEN 5; 325 MG/1; MG/1
1 TABLET ORAL EVERY 4 HOURS PRN
Status: DISCONTINUED | OUTPATIENT
Start: 2019-07-23 | End: 2019-07-27 | Stop reason: HOSPADM

## 2019-07-23 RX ORDER — GENTAMICIN SULFATE 80 MG/100ML
80 INJECTION, SOLUTION INTRAVENOUS
Status: DISCONTINUED | OUTPATIENT
Start: 2019-07-24 | End: 2019-07-27 | Stop reason: HOSPADM

## 2019-07-23 RX ORDER — GENTAMICIN SULFATE 60 MG/50ML
120 INJECTION, SOLUTION INTRAVENOUS ONCE
Status: COMPLETED | OUTPATIENT
Start: 2019-07-23 | End: 2019-07-23

## 2019-07-23 RX ORDER — CLINDAMYCIN PHOSPHATE 900 MG/50ML
900 INJECTION, SOLUTION INTRAVENOUS
Status: DISCONTINUED | OUTPATIENT
Start: 2019-07-23 | End: 2019-07-27 | Stop reason: HOSPADM

## 2019-07-23 RX ORDER — CEFAZOLIN SODIUM 2 G/50ML
2 SOLUTION INTRAVENOUS ONCE
Status: COMPLETED | OUTPATIENT
Start: 2019-07-23 | End: 2019-07-23

## 2019-07-23 RX ORDER — ACETAMINOPHEN 10 MG/ML
INJECTION, SOLUTION INTRAVENOUS
Status: DISCONTINUED | OUTPATIENT
Start: 2019-07-23 | End: 2019-07-23

## 2019-07-23 RX ORDER — BISACODYL 10 MG
10 SUPPOSITORY, RECTAL RECTAL ONCE AS NEEDED
Status: COMPLETED | OUTPATIENT
Start: 2019-07-23 | End: 2019-07-27

## 2019-07-23 RX ORDER — OXYTOCIN/RINGER'S LACTATE 20/1000 ML
41.65 PLASTIC BAG, INJECTION (ML) INTRAVENOUS CONTINUOUS
Status: ACTIVE | OUTPATIENT
Start: 2019-07-23 | End: 2019-07-24

## 2019-07-23 RX ADMIN — SODIUM CITRATE AND CITRIC ACID MONOHYDRATE 30 ML: 500; 334 SOLUTION ORAL at 04:07

## 2019-07-23 RX ADMIN — ACETAMINOPHEN 650 MG: 325 TABLET, FILM COATED ORAL at 08:07

## 2019-07-23 RX ADMIN — OXYTOCIN 10 UNITS: 10 INJECTION, SOLUTION INTRAMUSCULAR; INTRAVENOUS at 05:07

## 2019-07-23 RX ADMIN — Medication 10 ML/HR: at 12:07

## 2019-07-23 RX ADMIN — MIDAZOLAM HYDROCHLORIDE 1 MG: 1 INJECTION, SOLUTION INTRAMUSCULAR; INTRAVENOUS at 05:07

## 2019-07-23 RX ADMIN — GENTAMICIN SULFATE 120 MG: 60 INJECTION, SOLUTION INTRAVENOUS at 08:07

## 2019-07-23 RX ADMIN — SODIUM CHLORIDE, SODIUM LACTATE, POTASSIUM CHLORIDE, AND CALCIUM CHLORIDE: .6; .31; .03; .02 INJECTION, SOLUTION INTRAVENOUS at 12:07

## 2019-07-23 RX ADMIN — LIDOCAINE HYDROCHLORIDE,EPINEPHRINE BITARTRATE 2 ML: 20; .005 INJECTION, SOLUTION EPIDURAL; INFILTRATION; INTRACAUDAL; PERINEURAL at 05:07

## 2019-07-23 RX ADMIN — PHENYLEPHRINE HYDROCHLORIDE 100 MCG: 10 INJECTION INTRAVENOUS at 05:07

## 2019-07-23 RX ADMIN — METOCLOPRAMIDE 10 MG: 5 INJECTION, SOLUTION INTRAMUSCULAR; INTRAVENOUS at 04:07

## 2019-07-23 RX ADMIN — LIDOCAINE HYDROCHLORIDE,EPINEPHRINE BITARTRATE 3 ML: 15; .005 INJECTION, SOLUTION EPIDURAL; INFILTRATION; INTRACAUDAL; PERINEURAL at 02:07

## 2019-07-23 RX ADMIN — SODIUM CHLORIDE, SODIUM LACTATE, POTASSIUM CHLORIDE, AND CALCIUM CHLORIDE: .6; .31; .03; .02 INJECTION, SOLUTION INTRAVENOUS at 08:07

## 2019-07-23 RX ADMIN — MUPIROCIN 1 G: 20 OINTMENT TOPICAL at 09:07

## 2019-07-23 RX ADMIN — CEFAZOLIN SODIUM 2 G: 2 SOLUTION INTRAVENOUS at 04:07

## 2019-07-23 RX ADMIN — PHENYLEPHRINE HYDROCHLORIDE 200 MCG: 10 INJECTION INTRAVENOUS at 05:07

## 2019-07-23 RX ADMIN — BUPIVACAINE HYDROCHLORIDE 5 ML: 2.5 INJECTION, SOLUTION INFILTRATION; PERINEURAL at 02:07

## 2019-07-23 RX ADMIN — PHENYLEPHRINE HYDROCHLORIDE 200 MCG: 10 INJECTION INTRAVENOUS at 06:07

## 2019-07-23 RX ADMIN — SODIUM CHLORIDE, SODIUM LACTATE, POTASSIUM CHLORIDE, AND CALCIUM CHLORIDE: .6; .31; .03; .02 INJECTION, SOLUTION INTRAVENOUS at 05:07

## 2019-07-23 RX ADMIN — LIDOCAINE HYDROCHLORIDE,EPINEPHRINE BITARTRATE 5 ML: 20; .005 INJECTION, SOLUTION EPIDURAL; INFILTRATION; INTRACAUDAL; PERINEURAL at 04:07

## 2019-07-23 RX ADMIN — LIDOCAINE HYDROCHLORIDE,EPINEPHRINE BITARTRATE 3 ML: 20; .005 INJECTION, SOLUTION EPIDURAL; INFILTRATION; INTRACAUDAL; PERINEURAL at 04:07

## 2019-07-23 RX ADMIN — CLINDAMYCIN IN 5 PERCENT DEXTROSE 900 MG: 18 INJECTION, SOLUTION INTRAVENOUS at 07:07

## 2019-07-23 RX ADMIN — FAMOTIDINE 20 MG: 10 INJECTION INTRAVENOUS at 04:07

## 2019-07-23 RX ADMIN — ACETAMINOPHEN 1000 MG: 10 INJECTION, SOLUTION INTRAVENOUS at 06:07

## 2019-07-23 RX ADMIN — FENTANYL CITRATE 100 MCG: 50 INJECTION, SOLUTION INTRAMUSCULAR; INTRAVENOUS at 05:07

## 2019-07-23 RX ADMIN — BUPIVACAINE HYDROCHLORIDE 4 ML: 5 INJECTION, SOLUTION EPIDURAL; INTRACAUDAL; PERINEURAL at 05:07

## 2019-07-23 RX ADMIN — BUPIVACAINE HYDROCHLORIDE 8 ML: 2.5 INJECTION, SOLUTION INFILTRATION; PERINEURAL at 12:07

## 2019-07-23 RX ADMIN — AZITHROMYCIN MONOHYDRATE 500 MG: 500 INJECTION, POWDER, LYOPHILIZED, FOR SOLUTION INTRAVENOUS at 04:07

## 2019-07-23 RX ADMIN — SODIUM CHLORIDE, SODIUM LACTATE, POTASSIUM CHLORIDE, AND CALCIUM CHLORIDE: .6; .31; .03; .02 INJECTION, SOLUTION INTRAVENOUS at 01:07

## 2019-07-23 RX ADMIN — Medication: at 07:07

## 2019-07-23 RX ADMIN — PHENYLEPHRINE HYDROCHLORIDE 100 MCG: 10 INJECTION INTRAVENOUS at 06:07

## 2019-07-23 RX ADMIN — SODIUM CHLORIDE, SODIUM LACTATE, POTASSIUM CHLORIDE, AND CALCIUM CHLORIDE: .6; .31; .03; .02 INJECTION, SOLUTION INTRAVENOUS at 07:07

## 2019-07-23 NOTE — L&D DELIVERY NOTE
PROCEDURE NOTE    Pre-operative Diagnosis: 40w0d failed induction and fetal intolerance to labor, IAI    Post-operative Diagnosis: same, large for gestational age, true knot    Procedure:  primary Low Uterine Transverse  Section    Surgeon: Teressa Hammond    Assistant: Adriel Alvarez    Anesthesia: epidural    Complications: None; patient tolerated the procedure well     Findings: normal uterus, tubes and ovaries, a true know was found in the umbilical cord.     Infant: male 9 pound 14 oz    Procedure Details  The patient was seen prior to moving to OR. The risks, benefits, complications, treatment options, and expected outcomes were discussed with the patient. The patient concurred with the proposed plan, giving informed consent. The site of surgery properly noted/marked. The patient was taken to Operating Room , identified as Diana Dupont and the procedure verified as  Delivery. A Time Out was held and the above information confirmed. She received IV antibiotics and sequential compression devices were in place before beginning surgery.    After epidural anesthesia was found to be adequate, the patient was draped and prepped in the usual sterile manner. A Pfannenstiel incision was made and carried down through the subcutaneous tissue to the fascia. Fascial incision was made and extended transversely. The fascia was  from the underlying rectus tissue superiorly and inferiorly. The linea alba was taken down sharply and the peritoneum was identified and entered. Peritoneal incision was extended longitudinally. The bladder retractor was placed.  The utero-vesical peritoneal reflection was incised transversely and the bladder flap was bluntly freed from the lower uterine segment. A low transverse uterine incision was then started with the knife, but the uterus was entered bluntly. Amniotic fluid was noted to be clear but malodor.    The fetus was encountered in a cephalic but asynclitic position. A  nuchal cord was not noted but there was a true knot.  The body then delivered. The umbilical cord was clamped, the infant was handed to the waiting nurse. The placenta was spontaneously expressed and found to be intact and appeared normal. The uterus was exteriorized. The uterus, tubes and ovaries appeared normal. The uterine incision was closed with running locked sutures of delayed absorbable suture in a double layered fashion.  There was a hematoma on the left incisional apex which was controlled with two figure of eight sutures.  Hemostasis was excellent.  The uterus was then placed back in the pelvic cavity.    The peritoneum was closed with delayed absorbable suture. The subfascial space was assessed for hemostasis and found to be excellent. The fascia was then reapproximated with running sutures starting at the apexes and meeting in the midline using delayed absorbable suture. The subcutaneous tissue was cleaned and assessed for hemostasis and found to be excellent. The skin was reapproximated using 4-0 monocryl in running subcutaneous fashion.  aquecele was placed    Instrument, sponge, and needle counts were reported as correct x 3 prior to conclusion of the case.    Disposition: routine post-op  recovery in room    Condition: stable    Teressa Hammond  6:42 PM  2019    Delivery Information for  Jin Dupont    Birth information:  YOB: 2018   Time of birth: 3:39 PM   Sex: male   Head Delivery Date/Time: 2019  3:39 PM   Delivery type: , Low Transverse   Gestational Age: 40w0d    Delivery Providers    Delivering clinician:  Teressa Lawson Mai, MD   Provider Role    PETRA Cevallos, PETRA Ceja, NP     JAMES Ward MD Nikol M. Kremer, CST     [facility]             Measurements    Weight:  4490 g  Length:           Apgars    Living status:  Living  Apgars:   1 min.:   5 min.:   10  min.:   15 min.:   20 min.:     Skin color:   0  1       Heart rate:   2  2       Reflex irritability:   2  2       Muscle tone:   2  2       Respiratory effort:   2  2       Total:   8  9       Apgars assigned by:  GEORGIA SANDERS JOSE         Operative Delivery    Forceps attempted?:  No  Vacuum extractor attempted?:  No         Shoulder Dystocia    Shoulder dystocia present?:  No           Presentation    Presentation:  Vertex  Position:  Occiput           Interventions/Resuscitation    Method:  Bulb Suctioning, NICU Attended, Tactile Stimulation       Cord    Vessels:  3 vessels  Complications:  Knot  Delayed Cord Clamping?:  No  Cord Blood Disposition:  Sent with Baby  Gases Sent?:  No  Stem Cell Collection (by MD):  No       Placenta    Placenta delivery date/time:  2019 1740  Placenta removal:  Manual removal  Placenta appearance:  Intact  Placenta disposition:  discarded           Labor Events:       labor: No     Labor Onset Date/Time: 2019 00:28     Dilation Complete Date/Time:         Start Pushing Date/Time:       Rupture Date/Time:              Rupture type:           Fluid Amount:        Fluid Color:        Fluid Odor:        Membrane Status (PeriCalm): SRM (Spontaneous Rupture)      Rupture Date/Time (PeriCalm): 2019 00:28:00      Fluid Amount (PeriCalm):        Fluid Color (PeriCalm): Clear       steroids: None     Antibiotics given for GBS: No     Induction: balloon dilation (Tejada);misoprostol;dinoprostone insert     Indications for induction:  Elective     Augmentation: oxytocin     Indications for augmentation: Ineffective Contraction Pattern     Labor complications: Fetal Intolerance     Additional complications:          Cervical ripenin2019 6:29 PM      Cervidil          Delivery:      Episiotomy: None     Indication for Episiotomy:       Perineal Lacerations: None Repaired:      Periurethral Laceration:   Repaired:     Labial Laceration:   Repaired:      Sulcus Laceration:   Repaired:     Vaginal Laceration:   Repaired:     Cervical Laceration:   Repaired:     Repair suture:       Repair # of packets:       Last Value - EBL - Nursing (mL):       Sum - EBL - Nursing (mL): 0     Last Value - EBL - Anesthesia (mL):      Calculated QBL (mL): 356      Vaginal Sweep Performed: No     Surgicount Correct: No       Other providers:       Anesthesia    Method:  Epidural          Details (if applicable):  Trial of Labor Yes    Categorization: Primary    Priority: Routine   Indications for : Fetal Intolerance of Labor;Failure to Progress   Incision Type: low transverse     Additional  information:  Forceps:    Vacuum:    Breech:    Observed anomalies    Other (Comments):

## 2019-07-23 NOTE — TRANSFER OF CARE
"Anesthesia Transfer of Care Note    Patient: Diana Dupont    Procedure(s) Performed: Procedure(s) (LRB):   SECTION (N/A)    Patient location: Labor and Delivery    Anesthesia Type: epidural    Transport from OR: Transported from OR on room air with adequate spontaneous ventilation    Post pain: adequate analgesia    Post assessment: no apparent anesthetic complications    Post vital signs: stable    Level of consciousness: awake, alert and oriented    Nausea/Vomiting: no nausea/vomiting    Complications: none    Transfer of care protocol was followed      Last vitals:   Visit Vitals  /60   Pulse (!) 119   Temp 37.9 °C (100.2 °F)   Resp 18   Ht 5' 4" (1.626 m)   Wt 83.9 kg (185 lb)   LMP  (LMP Unknown)   SpO2 100%   Breastfeeding? No   BMI 31.76 kg/m²     "

## 2019-07-23 NOTE — ANESTHESIA PROCEDURE NOTES
Epidural    Patient location during procedure: OB   Reason for block: primary anesthetic   Diagnosis: IUP   Start time: 7/23/2019 12:15 AM  Timeout: 7/23/2019 12:14 AM  End time: 7/23/2019 12:26 AM    Staffing  Performing Provider: Sherwin Sterling MD  Authorizing Provider: Sherwin Sterling MD        Preanesthetic Checklist  Completed: patient identified, site marked, pre-op evaluation, timeout performed, IV checked, monitors and equipment checked, anesthesia consent given, hand hygiene performed and patient being monitored  Preparation  Patient position: sitting  Prep: ChloraPrep  Patient monitoring: Pulse Ox and Blood Pressure  Epidural  Skin Anesthetic: lidocaine 1%  Skin Wheal: 3 mL  Administration type: single shot  Approach: midline  Interspace: L3-4    Injection technique: EDUARDO air  Needle and Epidural Catheter  Needle type: Tuohy   Needle gauge: 17  Needle length: 3.5 inches  Needle insertion depth: 5 cm  Catheter type: [id number]  Catheter size: 19 G  Catheter at skin depth: 10 cm  Test dose: 3 mL of lidocaine 1.5% with Epi 1-to-200,000  Additional Documentation: incremental injection, negative aspiration for heme and CSF, no paresthesia on injection, no significant complaints from patient, no significant pain on injection and no signs/symptoms of IV or SA injection  Needle localization: anatomical landmarks  Assessment  Upper dermatomal levels - Left: T10  Right: T10   Dermatomal levels determined by pinch or prick  Ease of block: easy  Patient's tolerance of the procedure: comfortable throughout block and no complaintsNo inadvertent dural puncture with Tuohy.  Dural puncture not performed with spinal needle

## 2019-07-23 NOTE — H&P
[facility]  Obstetrics  History & Physical    Patient Name: Diana Dupont  MRN: [id number]  Admission Date: 2019  Primary Care Provider: Primary Doctor No    Subjective:     Principal Problem:<principal problem not specified>    History of Present Illness:  Pt admitted for elective IOL    Obstetric HPI:  Patient reports mild contractions, active fetal movement, No vaginal bleeding , No loss of fluid     This pregnancy has been complicated by none    OB History    Para Term  AB Living   1 0 0 0 0 0   SAB TAB Ectopic Multiple Live Births   0 0 0 0 0      # Outcome Date GA Lbr Estiven/2nd Weight Sex Delivery Anes PTL Lv   1 Current               Obstetric Comments   Every 30-45 days   Denies std   Never had pap     History reviewed. No pertinent past medical history.  History reviewed. No pertinent surgical history.    PTA Medications   Medication Sig    PRENATAL VITAMIN PLUS LOW IRON 27 mg iron- 1 mg Tab     ZATEAN-PN DHA 27 mg iron-1 mg -300 mg Cap        Review of patient's allergies indicates:  No Known Allergies     Family History     None        Tobacco Use    Smoking status: Never Smoker    Smokeless tobacco: Never Used   Substance and Sexual Activity    Alcohol use: No     Frequency: Never    Drug use: No    Sexual activity: Yes     Partners: Male     Birth control/protection: None     Review of Systems   All other systems reviewed and are negative.     Objective:     Vital Signs (Most Recent):  Temp: 98.4 °F (36.9 °C) (19 0708)  Pulse: 78 (19)  Resp: 20 (19)  BP: (!) 87/54 (19 1900)  SpO2: 96 % (19) Vital Signs (24h Range):  Temp:  [98.4 °F (36.9 °C)] 98.4 °F (36.9 °C)  Pulse:  [] 78  Resp:  [16-20] 20  SpO2:  [88 %-100 %] 96 %  BP: ()/(50-76) 87/54     Weight: 83.9 kg (185 lb)  Body mass index is 31.76 kg/m².    FHT: 130 bpm, mod carole, +accels, no decels. Cat 1 (reassuring)  TOCO:  Q 2-6 minutes    Physical Exam:    Constitutional: She is oriented to person, place, and time. She appears well-developed and well-nourished.    HENT:   Head: Normocephalic and atraumatic.    Eyes: Pupils are equal, round, and reactive to light. EOM are normal.     Cardiovascular: Exam reveals no clubbing and no cyanosis.     Pulmonary/Chest: Effort normal.        Abdominal: Soft. She exhibits no mass. There is no rebound and no guarding. No hernia.   Leopold # 8.5 lb             Musculoskeletal: Normal range of motion and moves all extremeties.       Neurological: She is alert and oriented to person, place, and time.    Skin: Skin is warm. No cyanosis. Nails show no clubbing.    Psychiatric: She has a normal mood and affect. Her behavior is normal. Thought content normal.       Cervix:  Dilation:  1  Effacement:  40%  Station: -3  Presentation: Vertex     Significant Labs:  Lab Results   Component Value Date    GROUPTRH A POS 2019    HEPBSAG Negative 2019    HEPBSAG Negative 2019    STREPBCULT No Group B Streptococcus isolated 2019       I have personallly reviewed all pertinent lab results from the last 24 hours.    Assessment/Plan:     24 y.o. female  at 39w6d for:    Uterine size-date discrepancy in third trimester  Continue cytotec, will reassess after 2nd dose, maybe candidate for marie bulb induction with pitocin        Teressa Hammond MD  Obstetrics  [facility]

## 2019-07-23 NOTE — PLAN OF CARE
Pt has been undergoing IOL for 48 hours  Pt got to 5 cm after marie bulb removed and has been unchanged for 15 hours  Pitocin was also decreased and discontinued a few time due to late decels  Discussed plan for primary LTCD due to failed IOL and fetal intolerance to labor  Pt and her  agreed and all questions answered

## 2019-07-23 NOTE — SUBJECTIVE & OBJECTIVE
Interval History:  Diana is a 24 y.o.  at 40w0d. She is doing well. Had epidural    Objective:     Vital Signs (Most Recent):  Temp: 100.2 °F (37.9 °C) (19)  Pulse: 94 (19)  Resp: 18 (19)  BP: (!) 110/58 (19)  SpO2: (!) 88 % (19) Vital Signs (24h Range):  Temp:  [98.1 °F (36.7 °C)-100.2 °F (37.9 °C)] 100.2 °F (37.9 °C)  Pulse:  [] 94  Resp:  [15-20] 18  SpO2:  [85 %-100 %] 88 %  BP: ()/(50-76) 110/58     Weight: 83.9 kg (185 lb)  Body mass index is 31.76 kg/m².    FHT: 130 bpm, mod carole, +accels, occasional late decels, one prolonged decel to 60 bpm x 6.5 during examination. Cat 2 (reassuring)  TOCO:  Q 2-4 minutes    Cervical Exam:  Dilation:  5  Effacement:  60%  Station: -2  Presentation: Vertex, iupc and fse placed     Significant Labs:  Lab Results   Component Value Date    GROUPTRH A POS 2019    HEPBSAG Negative 2019    HEPBSAG Negative 2019    STREPBCULT No Group B Streptococcus isolated 2019       I have personallly reviewed all pertinent lab results from the last 24 hours.    Physical Exam:   Constitutional: She is oriented to person, place, and time. She appears well-developed and well-nourished.    HENT:   Head: Normocephalic and atraumatic.    Eyes: Pupils are equal, round, and reactive to light. EOM are normal.     Cardiovascular: Exam reveals no clubbing and no cyanosis.     Pulmonary/Chest: Effort normal.        Abdominal: Soft. She exhibits no mass. There is no rebound and no guarding. No hernia.             Musculoskeletal: Normal range of motion and moves all extremeties.       Neurological: She is alert and oriented to person, place, and time.    Skin: Skin is warm. No cyanosis. Nails show no clubbing.    Psychiatric: She has a normal mood and affect. Her behavior is normal. Thought content normal.

## 2019-07-23 NOTE — NURSING
FSE and IUPC removed. Pt en route to OR2 from 218 via stretcher accompanied by RN, CRNA, and family member.

## 2019-07-23 NOTE — SUBJECTIVE & OBJECTIVE
Obstetric HPI:  Patient reports mild contractions, active fetal movement, No vaginal bleeding , No loss of fluid     This pregnancy has been complicated by none    OB History    Para Term  AB Living   1 0 0 0 0 0   SAB TAB Ectopic Multiple Live Births   0 0 0 0 0      # Outcome Date GA Lbr Estiven/2nd Weight Sex Delivery Anes PTL Lv   1 Current               Obstetric Comments   Every 30-45 days   Denies std   Never had pap     History reviewed. No pertinent past medical history.  History reviewed. No pertinent surgical history.    PTA Medications   Medication Sig    PRENATAL VITAMIN PLUS LOW IRON 27 mg iron- 1 mg Tab     ZATEAN-PN DHA 27 mg iron-1 mg -300 mg Cap        Review of patient's allergies indicates:  No Known Allergies     Family History     None        Tobacco Use    Smoking status: Never Smoker    Smokeless tobacco: Never Used   Substance and Sexual Activity    Alcohol use: No     Frequency: Never    Drug use: No    Sexual activity: Yes     Partners: Male     Birth control/protection: None     Review of Systems   All other systems reviewed and are negative.     Objective:     Vital Signs (Most Recent):  Temp: 98.4 °F (36.9 °C) (19 07)  Pulse: 78 (19)  Resp: 20 (19)  BP: (!) 87/54 (19 1900)  SpO2: 96 % (19) Vital Signs (24h Range):  Temp:  [98.4 °F (36.9 °C)] 98.4 °F (36.9 °C)  Pulse:  [] 78  Resp:  [16-20] 20  SpO2:  [88 %-100 %] 96 %  BP: ()/(50-76) 87/54     Weight: 83.9 kg (185 lb)  Body mass index is 31.76 kg/m².    FHT: 130 bpm, mod carole, +accels, no decels. Cat 1 (reassuring)  TOCO:  Q 2-6 minutes    Physical Exam:   Constitutional: She is oriented to person, place, and time. She appears well-developed and well-nourished.    HENT:   Head: Normocephalic and atraumatic.    Eyes: Pupils are equal, round, and reactive to light. EOM are normal.     Cardiovascular: Exam reveals no clubbing and no cyanosis.     Pulmonary/Chest:  Effort normal.        Abdominal: Soft. She exhibits no mass. There is no rebound and no guarding. No hernia.   Leopold # 8.5 lb             Musculoskeletal: Normal range of motion and moves all extremeties.       Neurological: She is alert and oriented to person, place, and time.    Skin: Skin is warm. No cyanosis. Nails show no clubbing.    Psychiatric: She has a normal mood and affect. Her behavior is normal. Thought content normal.       Cervix:  Dilation:  1  Effacement:  40%  Station: -3  Presentation: Vertex     Significant Labs:  Lab Results   Component Value Date    GROUPTRH A POS 07/21/2019    HEPBSAG Negative 01/16/2019    HEPBSAG Negative 01/16/2019    STREPBCULT No Group B Streptococcus isolated 06/20/2019       I have personallly reviewed all pertinent lab results from the last 24 hours.

## 2019-07-23 NOTE — NURSING
Cook catheter easily removed with gentle tug. sve 4 cm./-1. Full relief of pain with epidural placement. No distress noted. Abdomen remains soft in between moderate to strong contractions w/o tenderness to palpation.  Tejada catheter placed. Draining clear yellow urine. S.o in room

## 2019-07-23 NOTE — NURSING
Dr. Hammond at BS. Discussed IUPC and FSE placement with patient.  Pt verbalized understanding. Temp 100.2. Verbal order received for Tylenol 6950mg PO for 1 dose. IUPC and FSE placed per MD.

## 2019-07-23 NOTE — NURSING
Dr. Hammond called for patient update. Report category II tracing, moderate variablity with variable and late decelerations, SVE 5/70/-2, fetal inventions as noted in flowsheet. Phone order received to recheck SVE at 1500. Will continue to monitor.

## 2019-07-23 NOTE — ANESTHESIA PREPROCEDURE EVALUATION
07/23/2019  Diana Dupont is a 24 y.o., female.    Anesthesia Evaluation    I have reviewed the Patient Summary Reports.    I have reviewed the Nursing Notes.      Review of Systems  Anesthesia Hx:  No previous Anesthesia    Social:  Non-Smoker, No Alcohol Use    Hematology/Oncology:        Hematology Comments: No bleeding disorder   Cardiovascular:   Denies Hypertension.  Denies Dysrhythmias.   Denies Angina.    Pulmonary:  Pulmonary Normal    Renal/:  Renal/ Normal     Hepatic/GI:  Hepatic/GI Normal    OB/GYN/PEDS:  IUP   Neurological:  Neurology Normal    Endocrine:  Endocrine Normal        Physical Exam  General:  Well nourished    Airway/Jaw/Neck:  Airway Findings: Mouth Opening: Small, but > 3cm Tongue: Normal  Mallampati: III  TM Distance: 4 - 6 cm      Dental:  Dental Findings: In tact   Chest/Lungs:  Chest/Lungs Findings: Clear to auscultation, Normal Respiratory Rate     Heart/Vascular:  Heart Findings: Rate: Normal  Rhythm: Regular Rhythm        Mental Status:  Mental Status Findings:  Cooperative, Alert and Oriented       Wt Readings from Last 3 Encounters:   07/21/19 83.9 kg (185 lb)   07/17/19 82.9 kg (182 lb 12.2 oz)   07/12/19 84.5 kg (186 lb 2.9 oz)     Temp Readings from Last 3 Encounters:   07/22/19 36.9 °C (98.4 °F) (Oral)     BP Readings from Last 3 Encounters:   07/22/19 (!) 93/58   07/17/19 124/72   07/12/19 (!) 119/56     Pulse Readings from Last 3 Encounters:   07/22/19 91     Lab Results   Component Value Date    WBC 9.49 07/21/2019    HGB 12.4 07/21/2019    HCT 37.7 07/21/2019    MCV 90 07/21/2019     07/21/2019         Anesthesia Plan  Type of Anesthesia, risks & benefits discussed:  Anesthesia Type:  epidural  Patient's Preference:   Intra-op Monitoring Plan: standard ASA monitors  Intra-op Monitoring Plan Comments:   Post Op Pain Control Plan: epidural analgesia  Post Op  Pain Control Plan Comments:   Induction:    Beta Blocker:  Patient is not currently on a Beta-Blocker (No further documentation required).       Informed Consent: Patient understands risks and agrees with Anesthesia plan.  Questions answered. Anesthesia consent signed with patient.  ASA Score: 2     Day of Surgery Review of History & Physical: I have interviewed and examined the patient. I have reviewed the patient's H&P dated:            Ready For Surgery From Anesthesia Perspective.

## 2019-07-23 NOTE — NURSING
Dr. Hammond called for patient update. Reported SROM clear fluid, removal of cooks cath on previous shift, temp 99.6 at 0715, recurrent late decels, early decels, SVE 5/70/-2, pitocin infusion paused and restarted. MD states en route to unit and will consider FSE and IUPC placement.

## 2019-07-23 NOTE — NURSING
RN to BS for assessment. Pt appears awake, resting comfortably. Repositioned to left tilt. 200cc LR bolus started. Discussed plan of care with patient. Verbalized understanding. Denies needs at this time. Will continue to monitor.

## 2019-07-24 PROBLEM — O26.843 UTERINE SIZE-DATE DISCREPANCY IN THIRD TRIMESTER: Status: RESOLVED | Noted: 2019-07-21 | Resolved: 2019-07-24

## 2019-07-24 PROBLEM — Z98.891 S/P CESAREAN SECTION: Status: ACTIVE | Noted: 2019-07-24

## 2019-07-24 LAB
BASOPHILS # BLD AUTO: 0.01 K/UL (ref 0–0.2)
BASOPHILS NFR BLD: 0.1 % (ref 0–1.9)
DIFFERENTIAL METHOD: ABNORMAL
EOSINOPHIL # BLD AUTO: 0.1 K/UL (ref 0–0.5)
EOSINOPHIL NFR BLD: 0.4 % (ref 0–8)
ERYTHROCYTE [DISTWIDTH] IN BLOOD BY AUTOMATED COUNT: 13.7 % (ref 11.5–14.5)
ESTIMATED AVG GLUCOSE: 108 MG/DL (ref 68–131)
HBA1C MFR BLD HPLC: 5.4 % (ref 4–5.6)
HCT VFR BLD AUTO: 33 % (ref 37–48.5)
HGB BLD-MCNC: 11.2 G/DL (ref 12–16)
LYMPHOCYTES # BLD AUTO: 1.8 K/UL (ref 1–4.8)
LYMPHOCYTES NFR BLD: 11.1 % (ref 18–48)
MCH RBC QN AUTO: 30.8 PG (ref 27–31)
MCHC RBC AUTO-ENTMCNC: 33.9 G/DL (ref 32–36)
MCV RBC AUTO: 91 FL (ref 82–98)
MONOCYTES # BLD AUTO: 1.4 K/UL (ref 0.3–1)
MONOCYTES NFR BLD: 8.6 % (ref 4–15)
NEUTROPHILS # BLD AUTO: 13 K/UL (ref 1.8–7.7)
NEUTROPHILS NFR BLD: 80.1 % (ref 38–73)
PLATELET # BLD AUTO: 176 K/UL (ref 150–350)
PMV BLD AUTO: 9 FL (ref 9.2–12.9)
RBC # BLD AUTO: 3.64 M/UL (ref 4–5.4)
WBC # BLD AUTO: 16.3 K/UL (ref 3.9–12.7)

## 2019-07-24 PROCEDURE — 63600175 PHARM REV CODE 636 W HCPCS: Performed by: OBSTETRICS & GYNECOLOGY

## 2019-07-24 PROCEDURE — 63600175 PHARM REV CODE 636 W HCPCS: Performed by: ANESTHESIOLOGY

## 2019-07-24 PROCEDURE — 36415 COLL VENOUS BLD VENIPUNCTURE: CPT

## 2019-07-24 PROCEDURE — 99231 PR SUBSEQUENT HOSPITAL CARE,LEVL I: ICD-10-PCS | Mod: ,,, | Performed by: OBSTETRICS & GYNECOLOGY

## 2019-07-24 PROCEDURE — S0077 INJECTION, CLINDAMYCIN PHOSP: HCPCS | Performed by: OBSTETRICS & GYNECOLOGY

## 2019-07-24 PROCEDURE — 11000001 HC ACUTE MED/SURG PRIVATE ROOM

## 2019-07-24 PROCEDURE — 83036 HEMOGLOBIN GLYCOSYLATED A1C: CPT

## 2019-07-24 PROCEDURE — 25000003 PHARM REV CODE 250: Performed by: OBSTETRICS & GYNECOLOGY

## 2019-07-24 PROCEDURE — 85025 COMPLETE CBC W/AUTO DIFF WBC: CPT

## 2019-07-24 PROCEDURE — 99231 SBSQ HOSP IP/OBS SF/LOW 25: CPT | Mod: ,,, | Performed by: OBSTETRICS & GYNECOLOGY

## 2019-07-24 RX ORDER — SIMETHICONE 80 MG
1 TABLET,CHEWABLE ORAL 3 TIMES DAILY PRN
Status: DISCONTINUED | OUTPATIENT
Start: 2019-07-24 | End: 2019-07-27 | Stop reason: HOSPADM

## 2019-07-24 RX ADMIN — GENTAMICIN SULFATE 80 MG: 80 INJECTION, SOLUTION INTRAVENOUS at 04:07

## 2019-07-24 RX ADMIN — GENTAMICIN SULFATE 80 MG: 80 INJECTION, SOLUTION INTRAVENOUS at 08:07

## 2019-07-24 RX ADMIN — KETOROLAC TROMETHAMINE 30 MG: 30 INJECTION, SOLUTION INTRAMUSCULAR at 04:07

## 2019-07-24 RX ADMIN — HYDROCODONE BITARTRATE AND ACETAMINOPHEN 1 TABLET: 7.5; 325 TABLET ORAL at 08:07

## 2019-07-24 RX ADMIN — DOCUSATE SODIUM 200 MG: 100 CAPSULE, LIQUID FILLED ORAL at 09:07

## 2019-07-24 RX ADMIN — DOCUSATE SODIUM 200 MG: 100 CAPSULE, LIQUID FILLED ORAL at 08:07

## 2019-07-24 RX ADMIN — HYDROCODONE BITARTRATE AND ACETAMINOPHEN 1 TABLET: 7.5; 325 TABLET ORAL at 09:07

## 2019-07-24 RX ADMIN — CLINDAMYCIN IN 5 PERCENT DEXTROSE 900 MG: 18 INJECTION, SOLUTION INTRAVENOUS at 03:07

## 2019-07-24 RX ADMIN — CLINDAMYCIN IN 5 PERCENT DEXTROSE 900 MG: 18 INJECTION, SOLUTION INTRAVENOUS at 11:07

## 2019-07-24 RX ADMIN — HYDROCODONE BITARTRATE AND ACETAMINOPHEN 1 TABLET: 7.5; 325 TABLET ORAL at 03:07

## 2019-07-24 RX ADMIN — GENTAMICIN SULFATE 80 MG: 80 INJECTION, SOLUTION INTRAVENOUS at 01:07

## 2019-07-24 RX ADMIN — CLINDAMYCIN IN 5 PERCENT DEXTROSE 900 MG: 18 INJECTION, SOLUTION INTRAVENOUS at 06:07

## 2019-07-24 RX ADMIN — HYDROCODONE BITARTRATE AND ACETAMINOPHEN 1 TABLET: 7.5; 325 TABLET ORAL at 04:07

## 2019-07-24 RX ADMIN — MUPIROCIN 1 G: 20 OINTMENT TOPICAL at 09:07

## 2019-07-24 RX ADMIN — IBUPROFEN 600 MG: 600 TABLET ORAL at 09:07

## 2019-07-24 RX ADMIN — MUPIROCIN 1 G: 20 OINTMENT TOPICAL at 08:07

## 2019-07-24 NOTE — LACTATION NOTE
07/23/19 1900   Maternal Assessment   Breast Density Right:;soft   Areola Right:;dense;elastic   Nipples Right:;everted   Maternal Infant Feeding   Maternal Emotional State assist needed   Infant Positioning cradle   Signs of Milk Transfer audible swallow;infant jaw motion present   Pain with Feeding no   Latch Assistance yes   mother with baby skin to skin and breastfeeding on and of now -able to easily hand express colostrum to reassure mother she has milk -strong sucking with swallows on and off -baby slides off nipple but re-latches with some assist -mother denies diosocmofrt -review basic breastfeeding information and questions answered -states understanding

## 2019-07-24 NOTE — LACTATION NOTE
"This note was copied from a baby's chart.  [employer] pump, tubing, collections containers and labels brought to bedside.  Mother requests to pump at a later time.  Encouraged to initiate pumping asap for breast stimulation and milk production.  States "understand".  "

## 2019-07-24 NOTE — LACTATION NOTE
"   07/24/19 0830   Pain/Comfort/Sleep   Pain Body Location - Side Bilateral   Pain Body Location breast   Pain Rating (0-10): Activity 0   Breasts WDL   Breast WDL WDL   Maternal Feeding Assessment   Maternal Emotional State relaxed;assist needed   Infant Positioning clutch/football   Latch Assistance yes   Reproductive Interventions   Breastfeeding Assistance assisted with techniques for flat/inverted nipples;nipple shield utilized;supplemental feeding provided   Breastfeeding Support encouragement provided;lactation counseling provided     Max assist with position and latch to left breast in football hold.  Instructed on use of nipple shield.  Unable to sustain a latch without nipple shield. Latched with nipple shield but refused to suck.  Dribbled formula to shield and continues to refuse to suck.  Mother requests to formula feed at this time and initiate pumping.  Encouraged to continue to attempt at breast every feeding prior to pump and supplements.  Denies any other c/o or cocnerns.  Encouraged to call for assist prn.  States "understand" and verbalized appropriate recall.  "

## 2019-07-24 NOTE — PLAN OF CARE
Problem: Adult Inpatient Plan of Care  Goal: Plan of Care Review  Outcome: Ongoing (interventions implemented as appropriate)  VSS.  Bedrest, marie, pca.  Pain controlled with pca.  Bonding with infant.  Plan of care reviewed with pt,all questions and concerns addressed.

## 2019-07-24 NOTE — NURSING
Dr. Hammond updated on patient status, SVE /-2, pitocin paused; early, late, and variable decelerations noted; pt remains afebrile. MD called  at this time. Will be in at [phone] for section. Discussed plan of care and  delivery with patient. Verbalized good understanding. Questions answered at this time. Will continue to monitor.

## 2019-07-24 NOTE — LACTATION NOTE
This note was copied from a baby's chart.  mother wishes to supplement with formula.  Discussed options for choosing a formula.  Discussed formula preference of the assigned pediatrician.  Instructed on safe formula feeding, preparation and transporting of pre-mixed feedings.  Including:   Use of thoroughly cleaned and sterilized BPA free bottles   Formula & water preference to be determined by the advice of the pediatrician   Proper hand washing   Follow all s guidelines for preparing formula   Check expiration dates   Clean all can tops with soap and water prior to opening; also use a clean can opener   Never microwave bottles   Correct position of baby, nipple in the mouth and bottle position   Infant led feeding   Formula expires 1 hour after in initiation of the feeding   Re-warm feeding at the destination for no longer than 15 minutes  Formula feeding guide given and reviewed.  Pt verbalized understanding and provided appropriate recall..  Pt verbalized understanding and provided appropriate recall.

## 2019-07-24 NOTE — SUBJECTIVE & OBJECTIVE
Hospital course: Pt is a 23 yo  @ 39w6d presented to L&D for IOL  On admission she was 0.5 dilated, cervadil was placed but exam was unchanged.  S/p cytotec x 2 with marie bulb,   Pt dilated to 5/70/-2 and AROM with clear fluid at 0030 on 19  There was occasional late decels therefore pitocin was stopped twice overnight  Pt underwent a primary LTCD for failed IOL and fetal intolerance to labor on 19  Pt was started on Gent/clinda for IAI after delivery    Interval History:     She is doing well this morning. She is tolerating a clear diet without nausea or vomiting. She is not voiding spontaneously. She is not ambulating. She has not passed flatus, and has not a BM. Pt c/o gas pain. Vaginal bleeding is mild. She denies fever or chills. Abdominal pain is mild and controlled with oral medications. She is breastfeeding. She desires circumcision for her male baby: yes.    Objective:     Vital Signs (Most Recent):  Temp: (!) 100.5 °F (38.1 °C) (19 041)  Pulse: (!) 113 (19)  Resp: 20 (19)  BP: (!) 106/57 (19)  SpO2: 97 % (19) Vital Signs (24h Range):  Temp:  [98.1 °F (36.7 °C)-100.5 °F (38.1 °C)] 100.5 °F (38.1 °C)  Pulse:  [] 113  Resp:  [16-20] 20  SpO2:  [84 %-100 %] 97 %  BP: ()/(50-74) 106/57     Weight: 83.9 kg (185 lb)  Body mass index is 31.76 kg/m².      Intake/Output Summary (Last 24 hours) at 2019 07  Last data filed at 2019 0459  Gross per 24 hour   Intake 2760 ml   Output 5231 ml   Net -2471 ml       Significant Labs:  Lab Results   Component Value Date    GROUPTRH A POS 2019    HEPBSAG Negative 2019    HEPBSAG Negative 2019    STREPBCULT No Group B Streptococcus isolated 2019     Recent Labs   Lab 19  0616   HGB 11.2*   HCT 33.0*       I have personallly reviewed all pertinent lab results from the last 24 hours.    Physical Exam:   Constitutional: She is oriented to person, place, and time.  She appears well-developed and well-nourished.    HENT:   Head: Normocephalic and atraumatic.    Eyes: Pupils are equal, round, and reactive to light. EOM are normal.    Neck: Normal range of motion.     Pulmonary/Chest: Effort normal.        Abdominal: Soft. She exhibits no distension and no mass. There is no tenderness. There is no rebound and no guarding. No hernia.   Incision dressing dry clean and intact             Musculoskeletal: Normal range of motion and moves all extremeties.       Neurological: She is alert and oriented to person, place, and time.    Skin: Skin is warm.    Psychiatric: She has a normal mood and affect. Her behavior is normal. Thought content normal.

## 2019-07-24 NOTE — LACTATION NOTE
This note was copied from a baby's chart.  [employer]iant Symphony pump, tubing, collections containers and labels brought to bedside.  Discussed proper pump setting of initiation phase.  Instructed on proper usage of pump and to adjust suction according to maximum comfort level.  Verified appropriate flange fit.  Educated on the frequency and duration of pumping in order to promote and maintain a full milk supply.  Hands on pumping technique reviewed.  Encouraged hand expression after pumping.  Instructed on cleaning of breast pump parts.  Written instructions also given.  Pt verbalized understanding and appropriate recall for proper milk handling, collection, labeling, storage and transportation.

## 2019-07-24 NOTE — PROGRESS NOTES
[facility]  Obstetrics  Postpartum Progress Note    Patient Name: Diana Dupont  MRN: [id number]  Admission Date: 2019  Hospital Length of Stay: 3 days  Attending Physician: Teressa Lawson Mai, MD  Primary Care Provider: Primary Doctor No    Subjective:     Principal Problem:<principal problem not specified>    Hospital course: Pt is a 25 yo  @ 39w6d presented to L&D for IOL  On admission she was 0.5 dilated, cervadil was placed but exam was unchanged.  S/p cytotec x 2 with marie bulb,   Pt dilated to 5/70/-2 and AROM with clear fluid at 0030 on 19  There was occasional late decels therefore pitocin was stopped twice overnight  Pt underwent a primary LTCD for failed IOL and fetal intolerance to labor on 19  Pt was started on Gent/clinda for IAI after delivery    Interval History:     She is doing well this morning. She is tolerating a clear diet without nausea or vomiting. She is not voiding spontaneously. She is not ambulating. She has not passed flatus, and has not a BM. Pt c/o gas pain. Vaginal bleeding is mild. She denies fever or chills. Abdominal pain is mild and controlled with oral medications. She is breastfeeding. She desires circumcision for her male baby: yes.    Objective:     Vital Signs (Most Recent):  Temp: (!) 100.5 °F (38.1 °C) (19 0410)  Pulse: (!) 113 (19 0410)  Resp: 20 (19 0410)  BP: (!) 106/57 (19 0410)  SpO2: 97 % (19 0410) Vital Signs (24h Range):  Temp:  [98.1 °F (36.7 °C)-100.5 °F (38.1 °C)] 100.5 °F (38.1 °C)  Pulse:  [] 113  Resp:  [16-20] 20  SpO2:  [84 %-100 %] 97 %  BP: ()/(50-74) 106/57     Weight: 83.9 kg (185 lb)  Body mass index is 31.76 kg/m².      Intake/Output Summary (Last 24 hours) at 2019 0726  Last data filed at 2019 0459  Gross per 24 hour   Intake 2760 ml   Output 5231 ml   Net -2471 ml       Significant Labs:  Lab Results   Component Value Date    [facility]    HEPBSAG  Negative 2019    HEPBSAG Negative 2019    STREPBCULT No Group B Streptococcus isolated 2019     Recent Labs   Lab 19  0616   HGB 11.2*   HCT 33.0*       I have personallly reviewed all pertinent lab results from the last 24 hours.    Physical Exam:   Constitutional: She is oriented to person, place, and time. She appears well-developed and well-nourished.    HENT:   Head: Normocephalic and atraumatic.    Eyes: Pupils are equal, round, and reactive to light. EOM are normal.    Neck: Normal range of motion.     Pulmonary/Chest: Effort normal.        Abdominal: Soft. She exhibits no distension and no mass. There is no tenderness. There is no rebound and no guarding. No hernia.   Incision dressing dry clean and intact             Musculoskeletal: Normal range of motion and moves all extremeties.       Neurological: She is alert and oriented to person, place, and time.    Skin: Skin is warm.    Psychiatric: She has a normal mood and affect. Her behavior is normal. Thought content normal.       Assessment/Plan:     24 y.o. female  for:    Endometritis following delivery  On Gent/clinda  Last temp was 100.5 @ 4am on 19  Continue abx until 48 hours afebrile    S/P  section  Continue routine pp care  Pain control  Ambulate  Mylicon for gas pain        Disposition: As patient meets milestones, will plan to discharge.    Teressa Hammond MD  Obstetrics  [facility]

## 2019-07-25 LAB
GENTAMICIN PEAK SERPL-MCNC: 3.1 UG/ML (ref 5–30)
GENTAMICIN TROUGH SERPL-MCNC: 0.9 UG/ML (ref 0–2)

## 2019-07-25 PROCEDURE — S0077 INJECTION, CLINDAMYCIN PHOSP: HCPCS | Performed by: OBSTETRICS & GYNECOLOGY

## 2019-07-25 PROCEDURE — 11000001 HC ACUTE MED/SURG PRIVATE ROOM

## 2019-07-25 PROCEDURE — 99231 PR SUBSEQUENT HOSPITAL CARE,LEVL I: ICD-10-PCS | Mod: ,,, | Performed by: OBSTETRICS & GYNECOLOGY

## 2019-07-25 PROCEDURE — 63600175 PHARM REV CODE 636 W HCPCS: Performed by: OBSTETRICS & GYNECOLOGY

## 2019-07-25 PROCEDURE — 80170 ASSAY OF GENTAMICIN: CPT | Mod: 91

## 2019-07-25 PROCEDURE — 25000003 PHARM REV CODE 250: Performed by: OBSTETRICS & GYNECOLOGY

## 2019-07-25 PROCEDURE — 36415 COLL VENOUS BLD VENIPUNCTURE: CPT

## 2019-07-25 PROCEDURE — 99231 SBSQ HOSP IP/OBS SF/LOW 25: CPT | Mod: ,,, | Performed by: OBSTETRICS & GYNECOLOGY

## 2019-07-25 PROCEDURE — 80170 ASSAY OF GENTAMICIN: CPT

## 2019-07-25 PROCEDURE — 94761 N-INVAS EAR/PLS OXIMETRY MLT: CPT

## 2019-07-25 RX ADMIN — CLINDAMYCIN IN 5 PERCENT DEXTROSE 900 MG: 18 INJECTION, SOLUTION INTRAVENOUS at 03:07

## 2019-07-25 RX ADMIN — AMPICILLIN SODIUM 2 G: 2 INJECTION, POWDER, FOR SOLUTION INTRAMUSCULAR; INTRAVENOUS at 12:07

## 2019-07-25 RX ADMIN — MUPIROCIN 1 G: 20 OINTMENT TOPICAL at 08:07

## 2019-07-25 RX ADMIN — AMPICILLIN SODIUM 2 G: 2 INJECTION, POWDER, FOR SOLUTION INTRAMUSCULAR; INTRAVENOUS at 06:07

## 2019-07-25 RX ADMIN — HYDROCODONE BITARTRATE AND ACETAMINOPHEN 1 TABLET: 7.5; 325 TABLET ORAL at 04:07

## 2019-07-25 RX ADMIN — GENTAMICIN SULFATE 80 MG: 80 INJECTION, SOLUTION INTRAVENOUS at 12:07

## 2019-07-25 RX ADMIN — HYDROCODONE BITARTRATE AND ACETAMINOPHEN 1 TABLET: 7.5; 325 TABLET ORAL at 09:07

## 2019-07-25 RX ADMIN — DOCUSATE SODIUM 200 MG: 100 CAPSULE, LIQUID FILLED ORAL at 09:07

## 2019-07-25 RX ADMIN — GENTAMICIN SULFATE 80 MG: 80 INJECTION, SOLUTION INTRAVENOUS at 08:07

## 2019-07-25 RX ADMIN — MUPIROCIN 1 G: 20 OINTMENT TOPICAL at 09:07

## 2019-07-25 RX ADMIN — GENTAMICIN SULFATE 80 MG: 80 INJECTION, SOLUTION INTRAVENOUS at 04:07

## 2019-07-25 RX ADMIN — IBUPROFEN 600 MG: 600 TABLET ORAL at 09:07

## 2019-07-25 RX ADMIN — HYDROCODONE BITARTRATE AND ACETAMINOPHEN 1 TABLET: 7.5; 325 TABLET ORAL at 07:07

## 2019-07-25 RX ADMIN — DOCUSATE SODIUM 200 MG: 100 CAPSULE, LIQUID FILLED ORAL at 08:07

## 2019-07-25 RX ADMIN — CLINDAMYCIN IN 5 PERCENT DEXTROSE 900 MG: 18 INJECTION, SOLUTION INTRAVENOUS at 07:07

## 2019-07-25 RX ADMIN — CLINDAMYCIN IN 5 PERCENT DEXTROSE 900 MG: 18 INJECTION, SOLUTION INTRAVENOUS at 10:07

## 2019-07-25 NOTE — PROGRESS NOTES
[facility]  Obstetrics  Postpartum Progress Note    Patient Name: Diana Dupont  MRN: [id number]  Admission Date: 2019  Hospital Length of Stay: 4 days  Attending Physician: Teressa Lawson Mai, MD  Primary Care Provider: Primary Doctor No    Subjective:     Principal Problem: term pregnancy, s/p  delivery    Hospital course: Pt is a 25 yo  @ 39w6d presented to L&D for IOL  On admission she was 0.5 dilated, cervadil was placed but exam was unchanged.  S/p cytotec x 2 with marie bulb,   Pt dilated to 5/70/-2 and AROM with clear fluid at 0030 on 19  There was occasional late decels therefore pitocin was stopped twice overnight  Pt underwent a primary LTCD for failed IOL and fetal intolerance to labor on 19  Pt was started on Gent/clinda for IAI after delivery  19 - POD # 2, still have fever, every 24 hours, 100.5 at 4am    Interval History:     She is doing well this morning. She is tolerating a regular diet without nausea or vomiting. She is voiding spontaneously. She is ambulating. She has passed flatus, and has not a BM. Vaginal bleeding is mild. She reports fever or chills. Abdominal pain is moderate and controlled with oral medications. She is breastfeeding/bottle feeding. She desires circumcision for her male baby: yes.    Objective:     Vital Signs (Most Recent):  Temp: (!) 100.5 °F (38.1 °C) (19 0341)  Pulse: (!) 116 (19 0341)  Resp: 18 (19 0341)  BP: 115/62 (19 0341)  SpO2: 97 % (19 1256) Vital Signs (24h Range):  Temp:  [96.5 °F (35.8 °C)-100.5 °F (38.1 °C)] 100.5 °F (38.1 °C)  Pulse:  [] 116  Resp:  [17-20] 18  SpO2:  [97 %] 97 %  BP: ()/(59-74) 115/62     Weight: 83.9 kg (185 lb)  Body mass index is 31.76 kg/m².      Intake/Output Summary (Last 24 hours) at 2019 0726  Last data filed at 2019 0421  Gross per 24 hour   Intake 1130 ml   Output 1550 ml   Net -420 ml       Significant Labs:  Lab Results   Component  Value Date    GROUPTRH A POS 2019    HEPBSAG Negative 2019    HEPBSAG Negative 2019    STREPBCULT No Group B Streptococcus isolated 2019     Recent Labs   Lab 19  0616   HGB 11.2*   HCT 33.0*       I have personallly reviewed all pertinent lab results from the last 24 hours.    Physical Exam:   Constitutional: She is oriented to person, place, and time. She appears well-developed and well-nourished.    HENT:   Head: Normocephalic and atraumatic.    Eyes: Pupils are equal, round, and reactive to light. EOM are normal.     Cardiovascular: Exam reveals no clubbing and no cyanosis.     Pulmonary/Chest: Effort normal.        Abdominal: Soft. She exhibits distension. She exhibits no mass. There is no rebound and no guarding. No hernia.   Abdomen is tympanic  Incision: dry/clean/intact             Musculoskeletal: Normal range of motion and moves all extremeties.       Neurological: She is alert and oriented to person, place, and time.    Skin: Skin is warm. No cyanosis. Nails show no clubbing.    Psychiatric: She has a normal mood and affect. Her behavior is normal. Thought content normal.       Assessment/Plan:     24 y.o. female  for:    Endometritis following delivery  Still has fever  Last temp was 100.5 @ 4am on 19  On Gent/clinda, will add ampicillin  Will get gentamicin trough level  Continue abx until 48 hours afebrile    S/P  section  Continue routine pp care  Pain control  Ambulate  Mylicon for gas pain        Disposition: As patient meets milestones, will plan to discharge.    Teressa Hammond MD  Obstetrics  [facility]

## 2019-07-25 NOTE — SUBJECTIVE & OBJECTIVE
Hospital course: Pt is a 25 yo  @ 39w6d presented to L&D for IOL  On admission she was 0.5 dilated, cervadil was placed but exam was unchanged.  S/p cytotec x 2 with marie bulb,   Pt dilated to 5/70/-2 and AROM with clear fluid at 0030 on 19  There was occasional late decels therefore pitocin was stopped twice overnight  Pt underwent a primary LTCD for failed IOL and fetal intolerance to labor on 19  Pt was started on Gent/clinda for IAI after delivery  19 - POD # 2, still have fever, every 24 hours, 100.5 at 4am    Interval History:     She is doing well this morning. She is tolerating a regular diet without nausea or vomiting. She is voiding spontaneously. She is ambulating. She has passed flatus, and has not a BM. Vaginal bleeding is mild. She reports fever or chills. Abdominal pain is moderate and controlled with oral medications. She is breastfeeding/bottle feeding. She desires circumcision for her male baby: yes.    Objective:     Vital Signs (Most Recent):  Temp: (!) 100.5 °F (38.1 °C) (19 034)  Pulse: (!) 116 (19 034)  Resp: 18 (19 034)  BP: 115/62 (19 034)  SpO2: 97 % (19 1256) Vital Signs (24h Range):  Temp:  [96.5 °F (35.8 °C)-100.5 °F (38.1 °C)] 100.5 °F (38.1 °C)  Pulse:  [] 116  Resp:  [17-20] 18  SpO2:  [97 %] 97 %  BP: ()/(59-74) 115/62     Weight: 83.9 kg (185 lb)  Body mass index is 31.76 kg/m².      Intake/Output Summary (Last 24 hours) at 2019 0726  Last data filed at 2019 0421  Gross per 24 hour   Intake 1130 ml   Output 1550 ml   Net -420 ml       Significant Labs:  Lab Results   Component Value Date    GROUPTRH A POS 2019    HEPBSAG Negative 2019    HEPBSAG Negative 2019    STREPBCULT No Group B Streptococcus isolated 2019     Recent Labs   Lab 19  0616   HGB 11.2*   HCT 33.0*       I have personallly reviewed all pertinent lab results from the last 24 hours.    Physical Exam:    Constitutional: She is oriented to person, place, and time. She appears well-developed and well-nourished.    HENT:   Head: Normocephalic and atraumatic.    Eyes: Pupils are equal, round, and reactive to light. EOM are normal.     Cardiovascular: Exam reveals no clubbing and no cyanosis.     Pulmonary/Chest: Effort normal.        Abdominal: Soft. She exhibits distension. She exhibits no mass. There is no rebound and no guarding. No hernia.   Abdomen is tympanic  Incision: dry/clean/intact             Musculoskeletal: Normal range of motion and moves all extremeties.       Neurological: She is alert and oriented to person, place, and time.    Skin: Skin is warm. No cyanosis. Nails show no clubbing.    Psychiatric: She has a normal mood and affect. Her behavior is normal. Thought content normal.

## 2019-07-25 NOTE — LACTATION NOTE
07/25/19 0800   Maternal Assessment   Breast Density Bilateral:;soft   Areola elastic   Nipples Bilateral:;everted   Maternal Infant Feeding   Maternal Emotional State relaxed   Equipment Type   Breast Pump Type double electric, hospital grade   Breast Pump Flange Size 24 mm   Breast Pumping   Breast Pumping Interventions frequent pumping encouraged   Patient has not put baby to the breast since yesterday. Is pumping occasionally and getting 5-10 ml. Educated on supply and demand principles, states that she doesn't have enough milk for the baby. Offered to assist her with breastfeeding as the baby was waking up and she declined. Encouraged frequent stimulation of the breast. Lactation phone number provided for future needs.

## 2019-07-25 NOTE — ASSESSMENT & PLAN NOTE
Still has fever  Last temp was 100.5 @ 4am on 7/25/19  On Gent/clinda, will add ampicillin  Will get gentamicin trough level  Continue abx until 48 hours afebrile

## 2019-07-25 NOTE — PROGRESS NOTES
Dr. Hammond phoned regarding continuation of antibiotics; MD wants to continue antibiotics for 48 hours, does not want to order drug levels at this time.

## 2019-07-25 NOTE — PLAN OF CARE
Problem: Breastfeeding  Goal: Effective Breastfeeding    Intervention: Support Exclusive Breastfeeding Success  Breastfeed or pump breast at least 8 times in 24 hours. Keep feeding and diaper log.

## 2019-07-25 NOTE — PLAN OF CARE
Problem: Adult Inpatient Plan of Care  Goal: Optimal Comfort and Wellbeing  Outcome: Ongoing (interventions implemented as appropriate)  Pain adequately controlled with prescribed pain meds    Problem: Infection  Goal: Infection Symptom Resolution  Outcome: Ongoing (interventions implemented as appropriate)  IV Antibiotics continue per MD order

## 2019-07-25 NOTE — PROGRESS NOTES
Spoke with Dr. Hammond via phone. Orders noted for additional dose of Amp and to get trough/peak with next dose of Gent. PETRA Ledesma verbally notified.

## 2019-07-26 PROCEDURE — 99231 SBSQ HOSP IP/OBS SF/LOW 25: CPT | Mod: ,,, | Performed by: OBSTETRICS & GYNECOLOGY

## 2019-07-26 PROCEDURE — S0077 INJECTION, CLINDAMYCIN PHOSP: HCPCS | Performed by: OBSTETRICS & GYNECOLOGY

## 2019-07-26 PROCEDURE — 99231 PR SUBSEQUENT HOSPITAL CARE,LEVL I: ICD-10-PCS | Mod: ,,, | Performed by: OBSTETRICS & GYNECOLOGY

## 2019-07-26 PROCEDURE — 11000001 HC ACUTE MED/SURG PRIVATE ROOM

## 2019-07-26 PROCEDURE — 63600175 PHARM REV CODE 636 W HCPCS: Performed by: OBSTETRICS & GYNECOLOGY

## 2019-07-26 PROCEDURE — 25000003 PHARM REV CODE 250: Performed by: OBSTETRICS & GYNECOLOGY

## 2019-07-26 RX ADMIN — MUPIROCIN 1 G: 20 OINTMENT TOPICAL at 09:07

## 2019-07-26 RX ADMIN — AMPICILLIN SODIUM 2 G: 2 INJECTION, POWDER, FOR SOLUTION INTRAMUSCULAR; INTRAVENOUS at 07:07

## 2019-07-26 RX ADMIN — AMPICILLIN SODIUM 2 G: 2 INJECTION, POWDER, FOR SOLUTION INTRAMUSCULAR; INTRAVENOUS at 12:07

## 2019-07-26 RX ADMIN — OXYCODONE HYDROCHLORIDE AND ACETAMINOPHEN 1 TABLET: 5; 325 TABLET ORAL at 06:07

## 2019-07-26 RX ADMIN — OXYCODONE HYDROCHLORIDE AND ACETAMINOPHEN 1 TABLET: 5; 325 TABLET ORAL at 09:07

## 2019-07-26 RX ADMIN — MUPIROCIN 1 G: 20 OINTMENT TOPICAL at 08:07

## 2019-07-26 RX ADMIN — GENTAMICIN SULFATE 80 MG: 80 INJECTION, SOLUTION INTRAVENOUS at 08:07

## 2019-07-26 RX ADMIN — CLINDAMYCIN IN 5 PERCENT DEXTROSE 900 MG: 18 INJECTION, SOLUTION INTRAVENOUS at 06:07

## 2019-07-26 RX ADMIN — CLINDAMYCIN IN 5 PERCENT DEXTROSE 900 MG: 18 INJECTION, SOLUTION INTRAVENOUS at 03:07

## 2019-07-26 RX ADMIN — MAGNESIUM HYDROXIDE 2400 MG: 400 SUSPENSION ORAL at 06:07

## 2019-07-26 RX ADMIN — CLINDAMYCIN IN 5 PERCENT DEXTROSE 900 MG: 18 INJECTION, SOLUTION INTRAVENOUS at 10:07

## 2019-07-26 RX ADMIN — GENTAMICIN SULFATE 80 MG: 80 INJECTION, SOLUTION INTRAVENOUS at 04:07

## 2019-07-26 RX ADMIN — AMPICILLIN SODIUM 2 G: 2 INJECTION, POWDER, FOR SOLUTION INTRAMUSCULAR; INTRAVENOUS at 06:07

## 2019-07-26 RX ADMIN — GENTAMICIN SULFATE 80 MG: 80 INJECTION, SOLUTION INTRAVENOUS at 12:07

## 2019-07-26 RX ADMIN — SIMETHICONE CHEW TAB 80 MG 80 MG: 80 TABLET ORAL at 09:07

## 2019-07-26 RX ADMIN — DOCUSATE SODIUM 200 MG: 100 CAPSULE, LIQUID FILLED ORAL at 09:07

## 2019-07-26 RX ADMIN — DOCUSATE SODIUM 200 MG: 100 CAPSULE, LIQUID FILLED ORAL at 08:07

## 2019-07-26 NOTE — ANESTHESIA POSTPROCEDURE EVALUATION
Anesthesia Post Evaluation    Patient: Diana Dupont    Procedure(s) Performed: Procedure(s) (LRB):   SECTION (N/A)    Final Anesthesia Type: epidural  Patient location during evaluation: labor & delivery  Patient participation: Yes- Able to Participate  Level of consciousness: awake and alert  Post-procedure vital signs: reviewed and stable  Pain management: adequate  Airway patency: patent  PONV status at discharge: No PONV  Anesthetic complications: no      Cardiovascular status: blood pressure returned to baseline and hemodynamically stable  Respiratory status: unassisted and spontaneous ventilation  Hydration status: euvolemic  Follow-up not needed.          Vitals Value Taken Time   /66 2019  7:07 AM   Temp 35.8 °C (96.4 °F) 2019  7:07 AM   Pulse 92 2019  7:07 AM   Resp 18 2019  7:07 AM   SpO2 98 % 2019  7:07 AM         No case tracking events are documented in the log.      Pain/Grupo Score: Pain Rating Prior to Med Admin: 0 (2019  6:32 AM)  Pain Rating Post Med Admin: 0 (2019 10:00 PM)

## 2019-07-26 NOTE — PLAN OF CARE
Problem: Adult Inpatient Plan of Care  Goal: Plan of Care Review  Outcome: Ongoing (interventions implemented as appropriate)  VSS.  Good pain control.  Ambulating and voiding without difficulty.  Tolerating antibiotics well.  Attentive to baby's needs.  Pt verbalized understanding of plan of care.

## 2019-07-26 NOTE — ASSESSMENT & PLAN NOTE
Afebrile for 30 hours  Last temp was 100.5 @ 4am on 7/25/19  On Amp/Gent/clinda  Continue abx until 48 hours afebrile

## 2019-07-26 NOTE — PLAN OF CARE
Problem: Adult Inpatient Plan of Care  Goal: Plan of Care Review  Outcome: Ongoing (interventions implemented as appropriate)  Encouraged pumping 8 - 12 times in 24 hours with Symphony pump.  Appropriate labeling and storage of EBM noted.  Encouraged to call for assist prn.

## 2019-07-26 NOTE — SUBJECTIVE & OBJECTIVE
Hospital course: Pt is a 23 yo  @ 39w6d presented to L&D for IOL  On admission she was 0.5 dilated, cervadil was placed but exam was unchanged.  S/p cytotec x 2 with marie bulb,   Pt dilated to 5/70/-2 and AROM with clear fluid at 0030 on 19  There was occasional late decels therefore pitocin was stopped twice overnight  Pt underwent a primary LTCD for failed IOL and fetal intolerance to labor on 19  Pt was started on Gent/clinda for IAI after delivery  19 - POD # 2, still have fever, every 24 hours, 100.5 at 4am  19 -pod # 3, doing well no fever since 19 pm, passing more flatus - feeling better    Interval History:     She is doing well this morning. She is tolerating a regular diet without nausea or vomiting. She is voiding spontaneously. She is ambulating. She has passed flatus, and has not a BM. Vaginal bleeding is mild. She denies fever or chills. Abdominal pain is mild and controlled with oral medications. She is breastfeeding. She desires circumcision for her male baby: yes.    Objective:     Vital Signs (Most Recent):  Temp: 96.4 °F (35.8 °C) (19)  Pulse: 92 (19)  Resp: 18 (19)  BP: 109/66 (19)  SpO2: 98 % (19) Vital Signs (24h Range):  Temp:  [96.1 °F (35.6 °C)-97.9 °F (36.6 °C)] 96.4 °F (35.8 °C)  Pulse:  [] 92  Resp:  [18-20] 18  SpO2:  [96 %-100 %] 98 %  BP: ()/(52-66) 109/66     Weight: 66.2 kg (146 lb)  Body mass index is 25.06 kg/m².    No intake or output data in the 24 hours ending 19 0958    Significant Labs:  Lab Results   Component Value Date    GROUPTRH A POS 2019    HEPBSAG Negative 2019    HEPBSAG Negative 2019    STREPBCULT No Group B Streptococcus isolated 2019     No results for input(s): HGB, HCT in the last 48 hours.    I have personallly reviewed all pertinent lab results from the last 24 hours.    Physical Exam:   Constitutional: She is oriented to person, place,  and time. She appears well-developed and well-nourished.    HENT:   Head: Normocephalic and atraumatic.    Eyes: Pupils are equal, round, and reactive to light. EOM are normal.    Neck: Normal range of motion.     Pulmonary/Chest: Effort normal.        Abdominal: Soft. She exhibits no distension and no mass. There is no tenderness. There is no rebound and no guarding. No hernia.   Incision: dry/clean/intact  Tympanic on percussion but soft and non-tender               Musculoskeletal: Normal range of motion and moves all extremeties.       Neurological: She is alert and oriented to person, place, and time.    Skin: Skin is warm.    Psychiatric: She has a normal mood and affect. Her behavior is normal. Thought content normal.

## 2019-07-26 NOTE — LACTATION NOTE
"   07/26/19 0830   Pain/Comfort/Sleep   Pain Body Location - Side Bilateral   Pain Body Location breast   Pain Rating (0-10): Activity 0   Breasts WDL   Breast WDL WDL   Maternal Feeding Assessment   Maternal Emotional State assist needed   Latch Assistance no   Reproductive Interventions   Breastfeeding Assistance electric breast pump used   Breastfeeding Support encouragement provided;lactation counseling provided     Reports no longer attempting at the breast pumping and offering EBM and formula by bottle.  Offered assist with position and latch; declined.  Denies any c/o or concerns.  Encouraged pumping 8 - 10 times in 24 hours, discussed breast stimulation and milk production.  Encouraged to call for assist prn.  States "understand".  "

## 2019-07-26 NOTE — PROGRESS NOTES
[facility]  Obstetrics  Postpartum Progress Note    Patient Name: Diana Dupont  MRN: [id number]  Admission Date: 2019  Hospital Length of Stay: 5 days  Attending Physician: Teressa Lawson Mai, MD  Primary Care Provider: Primary Doctor No    Subjective:     Principal Problem:<principal problem not specified>    Hospital course: Pt is a 25 yo  @ 39w6d presented to L&D for IOL  On admission she was 0.5 dilated, cervadil was placed but exam was unchanged.  S/p cytotec x 2 with marie bulb,   Pt dilated to 5/70/-2 and AROM with clear fluid at 0030 on 19  There was occasional late decels therefore pitocin was stopped twice overnight  Pt underwent a primary LTCD for failed IOL and fetal intolerance to labor on 19  Pt was started on Gent/clinda for IAI after delivery  19 - POD # 2, still have fever, every 24 hours, 100.5 at 4am  19 -pod # 3, doing well no fever since 19 pm, passing more flatus - feeling better    Interval History:     She is doing well this morning. She is tolerating a regular diet without nausea or vomiting. She is voiding spontaneously. She is ambulating. She has passed flatus, and has not a BM. Vaginal bleeding is mild. She denies fever or chills. Abdominal pain is mild and controlled with oral medications. She is breastfeeding. She desires circumcision for her male baby: yes.    Objective:     Vital Signs (Most Recent):  Temp: 96.4 °F (35.8 °C) (19)  Pulse: 92 (19 07)  Resp: 18 (19)  BP: 109/66 (19 07)  SpO2: 98 % (19 07) Vital Signs (24h Range):  Temp:  [96.1 °F (35.6 °C)-97.9 °F (36.6 °C)] 96.4 °F (35.8 °C)  Pulse:  [] 92  Resp:  [18-20] 18  SpO2:  [96 %-100 %] 98 %  BP: ()/(52-66) 109/66     Weight: 66.2 kg (146 lb)  Body mass index is 25.06 kg/m².    No intake or output data in the 24 hours ending 19 0958    Significant Labs:  Lab Results   Component Value Date    [facility] POS 2019     HEPBSAG Negative 2019    HEPBSAG Negative 2019    STREPBCULT No Group B Streptococcus isolated 2019     No results for input(s): HGB, HCT in the last 48 hours.    I have personallly reviewed all pertinent lab results from the last 24 hours.    Physical Exam:   Constitutional: She is oriented to person, place, and time. She appears well-developed and well-nourished.    HENT:   Head: Normocephalic and atraumatic.    Eyes: Pupils are equal, round, and reactive to light. EOM are normal.    Neck: Normal range of motion.     Pulmonary/Chest: Effort normal.        Abdominal: Soft. She exhibits no distension and no mass. There is no tenderness. There is no rebound and no guarding. No hernia.   Incision: dry/clean/intact  Tympanic on percussion but soft and non-tender               Musculoskeletal: Normal range of motion and moves all extremeties.       Neurological: She is alert and oriented to person, place, and time.    Skin: Skin is warm.    Psychiatric: She has a normal mood and affect. Her behavior is normal. Thought content normal.       Assessment/Plan:     24 y.o. female  for:    Endometritis following delivery  Afebrile for 30 hours  Last temp was 100.5 @ 4am on 19  On Amp/Gent/clinda  Continue abx until 48 hours afebrile    S/P  section  Continue routine pp care  Pain control  Ambulate  Mylicon for gas pain        Disposition: As patient meets milestones, will plan to discharge tomorrow.    Teressa Hammond MD  Obstetrics  [facility]

## 2019-07-26 NOTE — LACTATION NOTE
This note was copied from a baby's chart.  Safe formula feeding handout given and reviewed.  Discussed proper hand washing, expiration time of formula, position of baby, position of nipple and bottle while feeding, baby led feeding and fullness cues.  Pt verbalized understanding and verbalized appropriate recall.

## 2019-07-27 VITALS
DIASTOLIC BLOOD PRESSURE: 59 MMHG | SYSTOLIC BLOOD PRESSURE: 115 MMHG | HEART RATE: 74 BPM | TEMPERATURE: 98 F | BODY MASS INDEX: 24.92 KG/M2 | WEIGHT: 146 LBS | RESPIRATION RATE: 17 BRPM | OXYGEN SATURATION: 97 % | HEIGHT: 64 IN

## 2019-07-27 PROCEDURE — 63600175 PHARM REV CODE 636 W HCPCS: Performed by: OBSTETRICS & GYNECOLOGY

## 2019-07-27 PROCEDURE — 99238 PR HOSPITAL DISCHARGE DAY,<30 MIN: ICD-10-PCS | Mod: ,,, | Performed by: OBSTETRICS & GYNECOLOGY

## 2019-07-27 PROCEDURE — S0077 INJECTION, CLINDAMYCIN PHOSP: HCPCS | Performed by: OBSTETRICS & GYNECOLOGY

## 2019-07-27 PROCEDURE — 25000003 PHARM REV CODE 250: Performed by: OBSTETRICS & GYNECOLOGY

## 2019-07-27 PROCEDURE — 99238 HOSP IP/OBS DSCHRG MGMT 30/<: CPT | Mod: ,,, | Performed by: OBSTETRICS & GYNECOLOGY

## 2019-07-27 RX ORDER — OXYCODONE AND ACETAMINOPHEN 5; 325 MG/1; MG/1
1 TABLET ORAL EVERY 6 HOURS PRN
Qty: 30 TABLET | Refills: 0 | Status: SHIPPED | OUTPATIENT
Start: 2019-07-27

## 2019-07-27 RX ORDER — SIMETHICONE 80 MG
80 TABLET,CHEWABLE ORAL 3 TIMES DAILY PRN
Qty: 30 TABLET | Refills: 2 | Status: SHIPPED | OUTPATIENT
Start: 2019-07-27

## 2019-07-27 RX ORDER — DOCUSATE SODIUM 100 MG/1
100 CAPSULE, LIQUID FILLED ORAL 2 TIMES DAILY
Qty: 60 CAPSULE | Refills: 3 | Status: SHIPPED | OUTPATIENT
Start: 2019-07-27

## 2019-07-27 RX ORDER — IBUPROFEN 600 MG/1
600 TABLET ORAL EVERY 6 HOURS PRN
Qty: 30 TABLET | Refills: 0 | Status: SHIPPED | OUTPATIENT
Start: 2019-07-27

## 2019-07-27 RX ADMIN — CLINDAMYCIN IN 5 PERCENT DEXTROSE 900 MG: 18 INJECTION, SOLUTION INTRAVENOUS at 03:07

## 2019-07-27 RX ADMIN — AMPICILLIN SODIUM 2 G: 2 INJECTION, POWDER, FOR SOLUTION INTRAMUSCULAR; INTRAVENOUS at 01:07

## 2019-07-27 RX ADMIN — MUPIROCIN 1 G: 20 OINTMENT TOPICAL at 08:07

## 2019-07-27 RX ADMIN — GENTAMICIN SULFATE 80 MG: 80 INJECTION, SOLUTION INTRAVENOUS at 04:07

## 2019-07-27 RX ADMIN — BISACODYL 10 MG: 10 SUPPOSITORY RECTAL at 08:07

## 2019-07-27 RX ADMIN — AMPICILLIN SODIUM 2 G: 2 INJECTION, POWDER, FOR SOLUTION INTRAMUSCULAR; INTRAVENOUS at 06:07

## 2019-07-27 RX ADMIN — DOCUSATE SODIUM 200 MG: 100 CAPSULE, LIQUID FILLED ORAL at 08:07

## 2019-07-27 NOTE — LACTATION NOTE
07/27/19 1400   Maternal Assessment   Breast Size Issue none   Breast Shape Bilateral:;round   Breast Density Bilateral:;filling   Equipment Type   Breast Pump Type double electric, hospital grade   Breast Pump Flange Type hard   Breast Pump Flange Size 24 mm   Breast Pumping   Breast Pumping Interventions frequent pumping encouraged   Lactation Referrals   Lactation Referrals support group;WIC (women, infants and children) program   Mother states desire to exclusively pump for baby. Has not pumped since last night. Emphasized importance of pumping every 3 hours to maintain milk supply. States understanding. States that she has electric breast pump at home. Reviewed pumping discharge instructions, milk storage instructions and engorgement precautions. Patient verbalizes understanding with good recall.

## 2019-07-27 NOTE — DISCHARGE SUMMARY
[facility]  Obstetrics  Discharge Summary      Patient Name: Diana Dupont  MRN: [id number]  Admission Date: 2019  Hospital Length of Stay: 6 days  Discharge Date and Time:  2019 6:57 AM  Attending Physician: Dhruv Lawson Mai, MD   Discharging Provider: Dhruv Hammond MD   Primary Care Provider: Primary Doctor No    HPI: No notes on file        Procedure(s) (LRB):  Primary LTCD due to failed IOL and fetal intolerance to labor    Hospital Course:   Pt is a 25 yo  @ 39w6d presented to L&D for IOL  On admission she was 0.5 dilated, cervadil was placed but exam was unchanged.  S/p cytotec x 2 with marie bulb,   Pt dilated to 5/70/-2 and AROM with clear fluid at 0030 on 19  There was occasional late decels therefore pitocin was stopped twice overnight  Pt underwent a primary LTCD for failed IOL and fetal intolerance to labor on 19  Pt was started on Gent/clinda for IAI after delivery  19 - POD # 2, still have fever, every 24 hours, 100.5 at 4am  19 -pod # 3, doing well no fever since 19 pm, passing more flatus - feeling better  19 -POD # 4, remained afebrile, vital signs stable, pt was discharged to home     Consults (From admission, onward)        Status Ordering Provider     Inpatient consult to Anesthesiology  Once     Provider:  (Not yet assigned)    Acknowledged DHRUV HAMMOND     Inpatient consult to Anesthesiology  Once     Provider:  (Not yet assigned)    Acknowledged DHRUV HAMMOND          Final Active Diagnoses:    Diagnosis Date Noted POA    PRINCIPAL PROBLEM:  S/P  section [Z98.891] 2019 Not Applicable    LGA (large for gestational age) infant [P08.1] 2019 Yes    Endometritis following delivery [O86.12] 2019 No      Problems Resolved During this Admission:    Diagnosis Date Noted Date Resolved POA    Uterine size-date discrepancy in third trimester [O26.843] 2019 Yes        Labs: CBC No results for  input(s): WBC, HGB, HCT, PLT in the last 48 hours.    Feeding Method: breast    Immunizations     Date Immunization Status Dose Route/Site Given by    19 Tdap Incomplete 0.5 mL Intramuscular/Left deltoid           Delivery:    Episiotomy: None   Lacerations: None   Repair suture:     Repair # of packets:     Blood loss (ml):       Birth information:  YOB: 2018   Time of birth: 5:39 PM   Sex: male   Delivery type: , Low Transverse   Gestational Age: 40w0d    Delivery Clinician:      Other providers:       Additional  information:  Forceps:    Vacuum:    Breech:    Observed anomalies      Living?:           APGARS  One minute Five minutes Ten minutes   Skin color:         Heart rate:         Grimace:         Muscle tone:         Breathing:         Totals: 8  9        Placenta: Delivered:       appearance    Pending Diagnostic Studies:     None          Discharged Condition: good    Disposition: Home or Self Care    Follow Up:  Follow-up Information     Teressa Hammond MD In 1 week.    Specialties:  Obstetrics and Gynecology, Obstetrics, Gynecology  Why:  For wound re-check  Contact information:  [address]  [place] [address]  [phone]                 Patient Instructions:      Diet Adult Regular     Notify your health care provider if you experience any of the following:  increased confusion or weakness     Notify your health care provider if you experience any of the following:  persistent dizziness, light-headedness, or visual disturbances     Notify your health care provider if you experience any of the following:  worsening rash     Notify your health care provider if you experience any of the following:  severe persistent headache     Notify your health care provider if you experience any of the following:  difficulty breathing or increased cough     Notify your health care provider if you experience any of the following:  redness, tenderness, or signs of infection  (pain, swelling, redness, odor or green/yellow discharge around incision site)     Notify your health care provider if you experience any of the following:  severe uncontrolled pain     Notify your health care provider if you experience any of the following:  persistent nausea and vomiting or diarrhea     Notify your health care provider if you experience any of the following:  temperature >100.4     Activity as tolerated     Medications:  Current Discharge Medication List      START taking these medications    Details   docusate sodium (COLACE) 100 MG capsule Take 1 capsule (100 mg total) by mouth 2 (two) times daily.  Qty: 60 capsule, Refills: 3      ibuprofen (ADVIL,MOTRIN) 600 MG tablet Take 1 tablet (600 mg total) by mouth every 6 (six) hours as needed.  Qty: 30 tablet, Refills: 0      oxyCODONE-acetaminophen (PERCOCET) 5-325 mg per tablet Take 1 tablet by mouth every 6 (six) hours as needed.  Qty: 30 tablet, Refills: 0      simethicone (MYLICON) 80 MG chewable tablet Take 1 tablet (80 mg total) by mouth 3 (three) times daily as needed for Flatulence.  Qty: 30 tablet, Refills: 2         CONTINUE these medications which have NOT CHANGED    Details   PRENATAL VITAMIN PLUS LOW IRON 27 mg iron- 1 mg Tab       ZATEAN-PN DHA 27 mg iron-1 mg -300 mg Cap              Teressa Hammond MD  Obstetrics  [facility]

## 2019-07-27 NOTE — DISCHARGE INSTRUCTIONS
After a Cesearean Birth    General Discharge Instructions  May follow a regular diet, unless otherwise discussed with physician.  Take showers, not baths unless otherwise discussed with physician.  Activity as tolerated.  No lifting or heavy exercise for 6 weeks, no driving for 2 weeks, no sexual intercourse, douching or tampons for 6 weeks  May return to work/school as discussed with physician  Discuss birth control with physician  Breast care support bra worn at all times  Lactation consultant referral number ( [phone] or [phone])    Call Your Healthcare Provider Right Away If You Have:  A fever of 101°F or higher.  Incisional drainage  Heavy vaginal bleeding, clots, or vaginal discharge with foul odor.  Redness, discharge, or pain worse than you had in the hospital.  Burning, pain, red streaks, or lumpy areas in your breasts.  Cracks, blisters, or blood on your nipples.  Burning or pain when you urinate.  Persistent nausea or vomiting.  Severe headaches, blurred vision, dizziness or fainting.  Feelings of extreme sadness or anxiety, or a feeling that you dont want to be with your baby.  No bowel movement for 5 days.  Redness, warmth, swelling, or pain in the lower leg.    Incision Care  You will be able to shower and pat the incision dry.  Watch your incision for signs of infection, such as increasing redness or drainage.  For ease of movement, hold a pillow against the incision when you get up from a lying or sitting position, and when you laugh or cough.  Avoid heavy lifting--nothing heavier than your baby until your doctor instructs you otherwise.       Follow-Up  Schedule a  follow-up exam with your healthcare provider for about 1-2 weeks after delivery. During this exam, your uterus and vaginal area will be checked. Contact your healthcare provider if you think you or your baby are having any problems.        Birth ()   A  birth is the surgical delivery  of a baby through an incision in the mothers abdomen.  births may be planned and scheduled. But, in many cases, a  is unexpected. In any case, a  is done to ensure the safest birth for both you and your baby.     Preparing for the Birth   The preparation for the birth is nearly the same whether scheduled or unscheduled. Surgery will begin shortly after you receive anesthesia. You will receive either regional or general anesthesia. Most cesareans are completed in less than an hour. During the birth, your healthcare team is with you, ready to take care of you and your . Your partner may also be with you for the birth     Making the Incisions   In a  birth, incisions are made in both the skin and the uterus. Either incision may be transverse (from side to side) or vertical. Your skin and uterine incisions may differ. Be sure they are noted in your health records.   The skin incision is usually transverse (side to side). It is located at the pubic hairline. A vertical incision may be used if youve had this incision before or if the  needs to be done quickly.   The uterine incision is almost always transverse. A transverse incision heals very well. This may allow for a future vaginal birth (). In certain cases, a vertical uterine incision may be made.           Your Babys Birth   Once the incisions are made, the doctor presses on the top of the uterus and guides the baby through the incision. The cord will be clamped and cut. Then the placenta is lifted out through the incision.   Taking Care of You   After your babys birth, the uterine incision is closed with stitches. Then, your skin incision will be closed with surgical staples or stitches and a dressing will be applied. Your doctor will press on your uterus. This helps expel blood clots through the vagina. You may be given medications to help shrink your uterus and decrease bleeding. You may also receive  antibiotics to reduce any risk of infection.     Taking Care of Your Baby   While your surgery is completed, your baby will be placed in an infant warmer. Gentle suction will be used to help remove excess fluid from the babys mouth and airways. Then the APGAR score will be done. This rates babys appearance (color), pulse (heart rate), grimace (muscle reflex), activity, and respiration. Your baby will be wrapped in a blanket and brought to you. Now, for the first time, youll see your .     Risks of    As with any surgery,  birth has risks. Your doctor will discuss the risks of  with you. They may include:   Bleeding   Infection   Injury to nearby organs   Reaction to anesthesia      © 8892-5658 [employer]. [address] [place] [address], [place] [address]. All rights reserved. This information is not intended as a substitute for professional medical care. Always follow your healthcare professional's instructions.        Discharge Instructions for  Section ()   You had a  section, or . During the , your baby was delivered through a surgical incision in your abdomen and uterus. Full recovery after a  can take time. Its important to take care of yourself--for your own sake and because your new baby needs you. Here are some guidelines to follow at home.     Incision Care   Shower as needed. Pat your incision dry.   Watch your incision for signs of infection, such as increasing redness or drainage.   Hold a pillow against the incision when you laugh or cough and when you get up from a lying or sitting position.   Remember, it can take as long as 6 weeks for a  incision to heal.    Activity   When to Call Your Doctor   Call your doctor right away if you have any of the following:   Fever of 100.4°F (38.0°C) or higher   Redness, pain, or drainage at your incision site   Repeated clots of blood (the size of a quarter or  larger) passing from the vagina   Bleeding that requires a new sanitary pad every hour   Severe pain in the abdomen   Pain or urgency with urination   Trouble urinating or emptying your bladder   No bowel movement within 1 week after the birth of your baby      Dont try to take care of anyone other than your baby and yourself.   Remember, the more active you are, the more likely you are to have an increase in your bleeding.   Get lots of rest. Take naps in the afternoon.   Increase your activities gradually.   Plan your activities so that you dont have to go up or down stairs more than necessary.   Do postsurgical deep breathing and coughing exercises. Ask your doctor for instructions.   Dont lift anything heavier than your baby until your doctor tells you its okay.   Dont drive until your doctor says its okay.   Dont have sexual intercourse until after youve had a follow-up appointment with your doctor and youve decided on a birth control method.   Dont take a tub bath or use douches or tampons for 4 weeks.    Follow-Up   Make a follow-up appointment as directed by our staff.     © 9731-3776 [employer]. [address] [place] [id number]. All rights reserved. This information is not intended as a substitute for professional medical care. Always follow your healthcare professional's instructions.    After a    It can take time to recover fully after a . Its important to take care of yourself--both for your own sake and because your new baby needs you.     Incision care   You will probably be able to shower and pat the incision dry.   Watch your incision for signs of infection. These include redness that gets worse or fluid draining from it.   Hold a pillow against the incision when you get up from a lying or sitting position. Also do this when you laugh or cough.   Avoid heavy lifting. Dont lift anything heavier than your baby until your doctor tells you  otherwise.    When to call your health care provider   Call your health care provider if you have:   A fever of 100.4°F (38.0°C or higher)   Redness, pain, or discharge at the incision site that gets worse   Repeated clots the size of a quarter or larger, passing from your vagina   You need to use a new sanitary pad every hour because of vaginal bleeding   Severe pain in your abdomen   No bowel movement within one week after the birth of your baby      © 0754-7929 [employer]. [address] [place] [address]. All rights reserved. This information is not intended as a substitute for professional medical care. Always follow your healthcare professional's instructions      Breast Care After Birth   A few days after your babys birth, your breasts will swell with milk. They are likely to feel tender and heavy. This is normal. To help prevent breast soreness and control irritation, follow these tips:       Coping with swelling   Use cold compresses or an ice pack to help reduce the ache or pain.   Breastfeed often to keep milk from clogging your breast ducts.   If your nipples are flat from breast swelling, hand express some milk. Squeeze out a few drops of milk by massaging and compressing your breasts.   If you have swelling with pain or fever, call your health care provider.  Preventing sore nipples   Make sure baby latches on to your breast correctly. The babys tongue should always be under your nipple, and your entire areola should be in the baby's mouth.   You can let milk dry on your nipples. This dried milk can protect the skin on your nipple/breasts.   Do not use alcohol, soap, or scented cleansers on your breasts. These can cause the nipples to dry and crack.   Do not wear nursing pads that are lined with plastic. They hold in moisture and can cause chapping.   If you experience cracked or bleeding nipples, consult your doctor or a lactation consultant. He or she will ensure that your  baby's latch is correct and may suggest topical treatment, such as pure lanolin.  Choosing a good bra   Wearing the right-sized bra is especially important now. If a bra is too tight, it may cause a duct in your breast to clog and become irritated. If possible, have a salesperson help fit you for a new bra. Look for one thats 100% cotton and comfortable. Also, choose a bra with wide straps that wont dig into your back and shoulders. If youre breastfeeding, find a nursing bra that allows you to uncover one breast at a time.   If you are not breastfeeding:   Avoid stimulation of nipples   Wear a tight-fitting bra   Apply ice packs for discomfort             Breastfeeding Discharge Instructions      AAP recommendation of exclusive breastfeeding for the first 6 months of life and continued breastfeeding with the introduction of supplemental foods beyond the first year of life and recommends to delay all bottle and pacifier use until after 4 weeks of age and breastfeeding is well established.  Discussed the benefits of exclusive breastfeeding for both mother and baby.  Discussed the risks of supplementation/pacifier use on the exclusivity of breastfeeding in the first 6 months. Feed the baby at the earliest sign of hunger or comfort  o Hands to mouth, sucking motions  o Rooting or searching for something to suck on  o Dont wait for crying - it is a not a late sign of hunger; it is a sign of distress     The feedings may be 8-12 times per 24hrs and will not follow a schedule   Alternate the breast you start the feeding with, or start with the breast that feels the fullest   Switch breasts when the baby takes himself off the breast or falls asleep   Keep offering breasts until the baby looks full, no longer gives hunger signs, and stays asleep when placed on his back in the crib   If the baby is sleepy and wont wake for a feeding, put the baby skin-to-skin dressed in a diaper against the mothers bare  chest   Sleep near your baby   The baby should be positioned and latched on to the breast correctly  o Chest-to-chest, chin in the breast  o Babys lips are flipped outward  o Babys mouth is stretched open wide like a shout  o Babys sucking should feel like tugging to the mother  - The baby should be drinking at the breast:  o You should hear swallowing or gulping throughout the feeding  o You should see milk on the babys lips when he comes off the breast  o Your breasts should be softer when the baby is finished feeding  o The baby should look relaxed at the end of feedings  o After the 4th day and your milk is in:  o The babys poop should turn bright yellow and be loose, watery, and seedy  o The baby should have at least 3-4 poops the size of the palm of your hand per day  o The baby should have at least 6-8 wet diapers per day  o The urine should be light yellow in color  You should drink when you are thirsty and eat a healthy diet when you are    hungry.     Take naps to get the rest you need.   Take medications and/or drink alcohol only with permission of your obstetrician    or the babys pediatrician.  You can also call the [facility],   ([phone]), Monday-Friday, 8am-5pm Central time, to get the most   up-to-date evidence-based information on the use of medications during   pregnancy and breastfeeding.      The baby should be examined by a pediatrician at 3-5 days of age; unless ordered sooner by the pediatrician.  Once your milk comes in, the baby should be back to birth weight no later than 10-14 days of age.    Primary Engorgement    If the milk is flowing, use wet or dry heat applied to the breasts for approximately 10min prior to each feeding as a comfort measure to facilitate the milk ejection reflex    Follow heat treatment with breast massage to soften hard/lumpy areas of the breast    Use unrestricted, frequent, effective feedings.      Wake baby to feed if necessary    Avoid  pacifier and bottle feedings    Hand express or pump breasts to the point of comfort prn    Use cold treatments in the form of ice packs/gel packs/ frozen vegetables wrapped in a soft thin cloth and applied to the breasts for approximately 20min after each feeding until engorgement is resolved    Wear comfortable, supportive bra    Take pain medicine prn    Use anti-inflammatory medications if prescribed by physician    Other:    [facility] Pumping Instructions :    Preparation and Hygiene:    1. Shower daily.  2. Wear a clean bra each day and wash daily in warm soapy water.  3. Change wet or moist breast pads frequently.  Moist pads can promote growth of germs.  4. Actively wash your hands, paying close attention to the area around and under your fingernails, thoroughly with soap and water for 15 seconds before pumping or handling your milk.  Re-wash your hands if you touch anything (scratching your nose, answering the phone, etc) while pumping or handling your milk.   5. Before pumping your breasts, assemble the pump collection kit and have ready the sterile container and labels.  Place these items on a clean surface next to the breastpump.  6. Each time after you have finished pumping, take apart all of the parts of the breastpump collection kit and place them in a separate cleaning container (do not place them in the sink).  Be sure to remove the yellow valve from the breastshield and separate the white membrane from the yellow valve.  Rinse all of these parts with cool water.  Then use a new sponge and/or bottle brush and dishwashing detergent to clean the parts.  Rinse off the soapy water with cool water and air dry on a clean towel covered with a clean cloth.  All parts may also be washed after each use in the top rack of a .  7. Once each day, sterilize all of the parts of the breastpump collection kit.  This can be done by boiling the kit parts for 10 minutes or by using a Quick Clean Micro-Steam Bag  made by [employer]  8. If condensation appears in the tubing, continue to run the pump with the tubing attached for 1-2 minutes or until the tubing is dry.   9. Notify your babys nurse or doctor if you become ill or need to take any medication, even over-the-counter medicines.        Collection and Storage of Expressed Breastmilk:         1. Pump your breasts at least 8-10 times every 24 hours.  Double pump (both breasts at  the same time) for at least 15-20 minutes using the most suction that is comfortable.    2. Write the date and time of pumping and the name of any medications you are takingon the babys pre-printed hospital identification label.   3.    Do not touch the inside of the storage containers or lids.      4.        Tightly screw the lid onto the container and place immediately into the                                refrigerator for daily use.  Bottle may remain at room temperature if the next                    feeding is within 4 hours.  5.    Expressed breastmilk should be refrigerated or frozen within 4 hours of                pumping.  6.        Do not store expressed breastmilk on the door of your refrigerator or freeze             where the temperature is warmer.   7.        Refrigerated milk may be stored in for up to 7 days.  At this point it can be              moved to the freezer for 6 -12 months.  8.        Thaw frozen breast milk in overnight in the refrigerator.  Once milk is thawed it              must be used within 24 hours.  9.        Refrigerated breast milk needs to be warmed to room temperature.  Warm by leaving unrefrigerated until it reached room temperature, or place sealed bottle into a cup of warm (not boiling) water or use a bottle warmer.               Never warm breast milk in a microwave or boiling water.    For any questions or concerns call:  Lactation Department at [phone]          Management of Engorgement in the Non-Nursing Mother **if you choose not to  breastfeed**    Your breastmilk will usually come in within 3-5 days after delivery even if you have decided not to breastfeed.  Your breasts may become full, lumpy, hard and uncomfortable due to the glands filling with milk and swelling of the breast tissue, blood vessels, and lymph glands.  This is a temporary condition usually lasting 24-48 hrs.  If your breasts become uncomfortable during this time, you may use some or all of the comfort measures described below to obtain relief.      Measures to relieve discomfort:    1. Wear a well fitting supportive bra. It should not be too tight or it may lead to plugged ducts or a breast infection.  (We do not recommend that you bind your breasts with any type of wrap.)    2. If the breasts begin to feel heavy, warm or uncomfortably full, begin using cold compresses on your breasts. Cold compresses can relieve the swelling and provide comfort.  Cold application can be in the form of ice packs, gel packs, frozen bags of vegetables or frozen wet towels. Cold compresses should be  wrapped in a thin towel or a pillow case and applied to the breasts  for 20 minutes at a time. This process can be repeated with a short break in between the applications of cold compresses until the swelling is relieved.     3.  Cold cabbage compresses can also be used to relieve pain and swelling.  Chilled cabbage leaves show similar pain reducing effects as cold compresses.  Some mothers prefer the cabbage leaves due to a stronger, more immediate effect. Cabbage leave effects are not clinically proven but many mothers find them very soothing.    How to apply chilled cabbage compresses:  rinse with cool water and refrigerate raw cabbage leaves.  Strip the large vein off the cold cabbage leaf and put the remaining part of the cabbage leaf directly on the breast. The leaves should be worn inside the bra until they wilt, usually within 2-4 hours.  When they wilt they should be replaced with fresh  leaves.   If redness,  rash, or itching occur stop use immediately.    4. Anti-inflammatory medications such as ibuprophen may be taken, with your physicians approval, to reduce inflammation and discomfort.     6. If fullness persists and remains painful even after all of the above measures are used, hand expressing a small amount of milk out of the breasts can provide an extra measure of comfort.  Warm showers, letting the warm water hit your back and roll over your shoulders to the breasts, while you gently express milk can make hand expressing a little easier. You should only remove enough milk to provide comfort and relief.   Repeat this process as often as needed to keep the breasts comfortable.    7. You can pump your breasts and remove just enough milk to feel softer, but not so much that more milk production is stimulated.   Repeat this process as often as needed to keep the breasts comfortable.    8. There is no need to limit the amount of fluids that you drink.     9. Engorgement will usually get better within 24-48 hrs; however, there may be some fullness and/or leaking of milk for several days. Wear breast pads to absorb any leaking milk and change them frequently.    10. If you have any flu-like symptoms such as fever, chills, feeling tired and achy or red areas on your breast, call your physician immediately.    11.Call the [facility], [phone], if the engorgement is not relieved or if you have questions.

## 2019-07-27 NOTE — PROGRESS NOTES
Pt verbalized understanding of all discharge instructions including follow up appointment and medications.  No complaints.  No signs of distress.   Awaiting baby's discharge.

## 2019-07-29 ENCOUNTER — TELEPHONE (OUTPATIENT)
Dept: OBSTETRICS AND GYNECOLOGY | Facility: HOSPITAL | Age: 24
End: 2019-07-29

## 2019-07-29 NOTE — TELEPHONE ENCOUNTER
Follow up phone call: patient states that she is pumping about 4 times a day getting 2 oz per pumping. Giving ebm and formula. Eats every 3 hours and takes about 3-4 oz per feed. Has had about 6 bowel movements and 6 wet diapers in the last 24 hours. Does not want to put baby to the breast and is happy with current plan. Has no questions or concerns at this time . Has lactation phone number for any future needs.

## 2019-08-06 ENCOUNTER — POSTPARTUM VISIT (OUTPATIENT)
Dept: OBSTETRICS AND GYNECOLOGY | Facility: CLINIC | Age: 24
End: 2019-08-06
Payer: MEDICAID

## 2019-08-06 VITALS
DIASTOLIC BLOOD PRESSURE: 66 MMHG | SYSTOLIC BLOOD PRESSURE: 104 MMHG | HEIGHT: 64 IN | BODY MASS INDEX: 27.06 KG/M2 | WEIGHT: 158.5 LBS

## 2019-08-06 DIAGNOSIS — Z98.891 S/P CESAREAN SECTION: ICD-10-CM

## 2019-08-06 PROBLEM — Z20.4: Status: RESOLVED | Noted: 2019-01-17 | Resolved: 2019-08-06

## 2019-08-06 PROBLEM — O99.891: Status: RESOLVED | Noted: 2019-01-17 | Resolved: 2019-08-06

## 2019-08-06 PROCEDURE — 99999 PR PBB SHADOW E&M-EST. PATIENT-LVL III: CPT | Mod: PBBFAC,,, | Performed by: OBSTETRICS & GYNECOLOGY

## 2019-08-06 PROCEDURE — 59430 PR CARE AFTER DELIVERY ONLY: ICD-10-PCS | Mod: ,,, | Performed by: OBSTETRICS & GYNECOLOGY

## 2019-08-06 PROCEDURE — 99213 OFFICE O/P EST LOW 20 MIN: CPT | Mod: PBBFAC | Performed by: OBSTETRICS & GYNECOLOGY

## 2019-08-06 PROCEDURE — 99999 PR PBB SHADOW E&M-EST. PATIENT-LVL III: ICD-10-PCS | Mod: PBBFAC,,, | Performed by: OBSTETRICS & GYNECOLOGY

## 2019-08-06 NOTE — PROGRESS NOTES
"CC: Post-partum follow-up    Diana Dupont is a 24 y.o. female  who presents for post-partum visit.  She is S/P a , due to failed IOL and fetal intolerance to labor..  She and the baby are doing well.  No pain.  No fever.   No bowel / bladder complaints.    Delivery Date: 2019  Delivery MD: tian Hammond  Gender: male  Birth Weight: 9 pounds 14 ounces  Breast Feeding: both bottle and breast  Depression: NO  Contraception: nexplanon    Pregnancy was complicated by:  LGA    /66 (BP Location: Right arm, Patient Position: Sitting, BP Method: Medium (Manual))   Ht 5' 4" (1.626 m)   Wt 71.9 kg (158 lb 8.2 oz)   LMP  (LMP Unknown)   Breastfeeding? No   BMI 27.21 kg/m²     ROS:  GENERAL: No fever, chills, fatigability.  VULVAR: No pain, no lesions and no itching.  VAGINAL: No relaxation, no itching, no discharge, no abnormal bleeding and no lesions.  ABDOMEN: No abdominal pain. Denies nausea. Denies vomiting. No diarrhea. No constipation  BREAST: Denies pain. No lumps. No discharge.  URINARY: No incontinence, no nocturia, no frequency and no dysuria.  CARDIOVASCULAR: No chest pain. No shortness of breath. No leg cramps.  NEUROLOGICAL: No headaches. No vision changes.    PHYSICAL EXAM:  ABDOMEN:  Soft, non-tender, non-distended  INCISION:  Dressing removed, dry/clean intact    IMP:  Doing well S/P primary LTCD  Instructions / precautions reviewed  Contraceptive counseling      PLAN:  Discussed wound care  -contraception:  Discussed options and the r/b/a of OCP, patches, nuvaring, depo injection, implants, hormonal and non-hormonal IUDs.    We discussed the advantages and disadvantages of each. We discussed the continued use of condoms to prevent STDs.  Pt elects nexplanon. Order placed today.       "

## 2019-11-14 ENCOUNTER — TELEPHONE (OUTPATIENT)
Dept: OBSTETRICS AND GYNECOLOGY | Facility: CLINIC | Age: 24
End: 2019-11-14

## 2019-11-14 NOTE — TELEPHONE ENCOUNTER
Contacted [employer] and [facility] re: [employer]. They stated they sent [facility] info [facility] delivery. Per [facility] no info was received. [employer] will refax to [facility].     Pt. Notified.

## 2019-11-21 ENCOUNTER — TELEPHONE (OUTPATIENT)
Dept: OBSTETRICS AND GYNECOLOGY | Facility: CLINIC | Age: 24
End: 2019-11-21

## 2019-11-21 NOTE — TELEPHONE ENCOUNTER
Attempted to contact patient re: Nexplanon not covered under [employer] coverage , no answer at home number.     Letter sent

## 2020-04-15 NOTE — PROGRESS NOTES
Discussed with Dr. Ashwin Kim, he would like the patient to restart antibiotics and be seen in clinic tomorrow 4/16 at 1030.  Verbal order for sulfamethoxazole-trimethoprim (BACTRIM DS) 800-160 MG per tablet BID for 7 days.      Attempted to reach patient to schedule appointment and obtain pharmacy information and phone was busy.  Will attempt to call again.         [facility]  Obstetrics  Labor Progress Note    Patient Name: Diana Dupont  MRN: [id number]  Admission Date: 2019  Hospital Length of Stay: 2 days  Attending Physician: Teressa Lawson Mai, MD  Primary Care Provider: Primary Doctor No    Subjective:     Principal Problem:<principal problem not specified>    Hospital Course:  Torsten is a 23 yo  @ 39w6d presented to L&D for IOL  On admission she was 0.5 dilated, cervadil was placed but exam was unchanged.  S/p cytotec x 2 with marie bulb,   Pt dilated to 5/70/-2 and AROM with clear fluid at 0030 on 19  There was occasional late decels therefore pitocin was stopped twice overnight    Interval History:  Diana is a 24 y.o.  at 40w0d. She is doing well. Had epidural    Objective:     Vital Signs (Most Recent):  Temp: 100.2 °F (37.9 °C) (19 0817)  Pulse: 94 (19 0715)  Resp: 18 (19)  BP: (!) 110/58 (19)  SpO2: (!) 88 % (19) Vital Signs (24h Range):  Temp:  [98.1 °F (36.7 °C)-100.2 °F (37.9 °C)] 100.2 °F (37.9 °C)  Pulse:  [] 94  Resp:  [15-20] 18  SpO2:  [85 %-100 %] 88 %  BP: ()/(50-76) 110/58     Weight: 83.9 kg (185 lb)  Body mass index is 31.76 kg/m².    FHT: 130 bpm, mod carole, +accels, occasional late decels, one prolonged decel to 60 bpm x 6.5 during examination. Cat 2 (reassuring)  TOCO:  Q 2-4 minutes    Cervical Exam:  Dilation:  5  Effacement:  60%  Station: -2  Presentation: Vertex, iupc and fse placed     Significant Labs:  Lab Results   Component Value Date    GROUPTRH A POS 2019    HEPBSAG Negative 2019    HEPBSAG Negative 2019    STREPBCULT No Group B Streptococcus isolated 2019       I have personallly reviewed all pertinent lab results from the last 24 hours.    Physical Exam:   Constitutional: She is oriented to person, place, and time. She appears well-developed and well-nourished.    HENT:   Head: Normocephalic and atraumatic.    Eyes: Pupils are equal,  round, and reactive to light. EOM are normal.     Cardiovascular: Exam reveals no clubbing and no cyanosis.     Pulmonary/Chest: Effort normal.        Abdominal: Soft. She exhibits no mass. There is no rebound and no guarding. No hernia.             Musculoskeletal: Normal range of motion and moves all extremeties.       Neurological: She is alert and oriented to person, place, and time.    Skin: Skin is warm. No cyanosis. Nails show no clubbing.    Psychiatric: She has a normal mood and affect. Her behavior is normal. Thought content normal.       Assessment/Plan:     24 y.o. female  at 40w0d for:    Uterine size-date discrepancy in third trimester  Latent labor  Iupc/fse placed for better monitoring  Continue pit as tolerated          Mary Ann-Elvis Hammond MD  Obstetrics  [facility]

## 2021-09-23 NOTE — PROGRESS NOTES
Here for routine OB visit  Denies VB/ctx/LOF. Active FM  Denies PreE symptoms/UTI symptoms  ROS: Negative except for round ligament    General:  NAD, AxO x 3  Abdomen: soft, gravid, non-tender  Ext: peripheral pulses normal, no pedal edema, no clubbing or cyanosis  Skin: normal, good color, good turgor and no lesions    Gbs/delivery consent    Strict Labor precautions, FKCs    Next visit: 2 weeks     Patient does not know current weight, states >300 pounds.

## 2024-02-29 ENCOUNTER — LAB (OUTPATIENT)
Dept: URBAN - METROPOLITAN AREA CLINIC 37 | Facility: CLINIC | Age: 29
End: 2024-02-29

## 2024-02-29 ENCOUNTER — OV NP (OUTPATIENT)
Dept: URBAN - METROPOLITAN AREA CLINIC 37 | Facility: CLINIC | Age: 29
End: 2024-02-29

## 2024-02-29 VITALS
BODY MASS INDEX: 31.82 KG/M2 | SYSTOLIC BLOOD PRESSURE: 127 MMHG | HEART RATE: 90 BPM | WEIGHT: 191 LBS | DIASTOLIC BLOOD PRESSURE: 81 MMHG | HEIGHT: 65 IN

## 2024-02-29 PROBLEM — 707724006: Status: ACTIVE | Noted: 2024-02-29

## 2024-02-29 RX ORDER — GEMFIBROZIL 600 MG/1
1 TABLET 30 MINUTES BEFORE MORNING AND EVENING MEALS TABLET, FILM COATED ORAL TWICE A DAY
Status: ACTIVE | COMMUNITY

## 2024-03-05 ENCOUNTER — US (OUTPATIENT)
Dept: URBAN - METROPOLITAN AREA CLINIC 38 | Facility: CLINIC | Age: 29
End: 2024-03-05
Payer: COMMERCIAL

## 2024-03-05 DIAGNOSIS — K76.0 FATTY (CHANGE OF) LIVER: ICD-10-CM

## 2024-03-05 PROCEDURE — 76705 ECHO EXAM OF ABDOMEN: CPT | Performed by: INTERNAL MEDICINE

## 2024-03-05 RX ORDER — GEMFIBROZIL 600 MG/1
1 TABLET 30 MINUTES BEFORE MORNING AND EVENING MEALS TABLET, FILM COATED ORAL TWICE A DAY
Status: ACTIVE | COMMUNITY

## 2024-04-02 ENCOUNTER — OV EP (OUTPATIENT)
Dept: URBAN - METROPOLITAN AREA CLINIC 37 | Facility: CLINIC | Age: 29
End: 2024-04-02
Payer: COMMERCIAL

## 2024-04-02 VITALS — BODY MASS INDEX: 31.32 KG/M2 | HEIGHT: 65 IN | WEIGHT: 188 LBS

## 2024-04-02 DIAGNOSIS — Z11.59 NEED FOR HEPATITIS C SCREENING TEST: ICD-10-CM

## 2024-04-02 DIAGNOSIS — K76.0 FATTY LIVER: ICD-10-CM

## 2024-04-02 DIAGNOSIS — K82.4 GALLBLADDER POLYP: ICD-10-CM

## 2024-04-02 DIAGNOSIS — R74.8 ELEVATED LIVER ENZYMES: ICD-10-CM

## 2024-04-02 PROBLEM — 197321007: Status: ACTIVE | Noted: 2024-04-02

## 2024-04-02 PROCEDURE — 99213 OFFICE O/P EST LOW 20 MIN: CPT | Performed by: NURSE PRACTITIONER

## 2024-04-02 RX ORDER — MULTIVIT WITH MINERALS/LUTEIN
1 CAPSULE TABLET ORAL TWICE A DAY
Qty: 60 CAPSULE | Refills: 3 | OUTPATIENT
Start: 2024-04-02

## 2024-04-02 RX ORDER — GEMFIBROZIL 600 MG/1
1 TABLET 30 MINUTES BEFORE MORNING AND EVENING MEALS TABLET, FILM COATED ORAL TWICE A DAY
Status: ACTIVE | COMMUNITY

## 2024-04-02 RX ORDER — PIOGLITAZONE 15 MG/1
1 TABLET TABLET ORAL ONCE A DAY
Qty: 30 | Refills: 5 | OUTPATIENT
Start: 2024-04-02

## 2024-05-06 PROBLEM — 365767001: Status: ACTIVE | Noted: 2024-05-06

## 2024-05-31 ENCOUNTER — LAB OUTSIDE AN ENCOUNTER (OUTPATIENT)
Dept: URBAN - METROPOLITAN AREA CLINIC 35 | Facility: CLINIC | Age: 29
End: 2024-05-31

## 2024-06-03 ENCOUNTER — LAB OUTSIDE AN ENCOUNTER (OUTPATIENT)
Dept: URBAN - METROPOLITAN AREA CLINIC 37 | Facility: CLINIC | Age: 29
End: 2024-06-03

## 2024-07-18 ENCOUNTER — OFFICE VISIT (OUTPATIENT)
Dept: URBAN - METROPOLITAN AREA CLINIC 37 | Facility: CLINIC | Age: 29
End: 2024-07-18

## 2024-08-01 ENCOUNTER — DASHBOARD ENCOUNTERS (OUTPATIENT)
Age: 29
End: 2024-08-01

## 2024-08-08 ENCOUNTER — OFFICE VISIT (OUTPATIENT)
Dept: URBAN - METROPOLITAN AREA CLINIC 37 | Facility: CLINIC | Age: 29
End: 2024-08-08

## 2024-08-14 ENCOUNTER — OFFICE VISIT (OUTPATIENT)
Dept: URBAN - METROPOLITAN AREA CLINIC 35 | Facility: CLINIC | Age: 29
End: 2024-08-14

## 2024-08-14 RX ORDER — PIOGLITAZONE 15 MG/1
1 TABLET TABLET ORAL ONCE A DAY
Qty: 30 | Refills: 5 | OUTPATIENT

## 2024-08-14 RX ORDER — PIOGLITAZONE 15 MG/1
1 TABLET TABLET ORAL ONCE A DAY
Qty: 30 | Refills: 5 | Status: ACTIVE | COMMUNITY
Start: 2024-04-02

## 2024-08-14 RX ORDER — MULTIVIT WITH MINERALS/LUTEIN
1 CAPSULE TABLET ORAL TWICE A DAY
Qty: 60 CAPSULE | Refills: 3 | Status: ACTIVE | COMMUNITY
Start: 2024-04-02

## 2024-08-14 RX ORDER — GEMFIBROZIL 600 MG/1
1 TABLET 30 MINUTES BEFORE MORNING AND EVENING MEALS TABLET, FILM COATED ORAL TWICE A DAY
Status: ACTIVE | COMMUNITY

## 2024-08-14 RX ORDER — MULTIVIT WITH MINERALS/LUTEIN
1 CAPSULE TABLET ORAL TWICE A DAY
Qty: 60 CAPSULE | Refills: 3 | OUTPATIENT

## (undated) DEVICE — DRESSING AQUACEL AG 3.5X10IN